# Patient Record
Sex: MALE | Race: WHITE | NOT HISPANIC OR LATINO | Employment: FULL TIME | URBAN - NONMETROPOLITAN AREA
[De-identification: names, ages, dates, MRNs, and addresses within clinical notes are randomized per-mention and may not be internally consistent; named-entity substitution may affect disease eponyms.]

---

## 2018-05-02 ENCOUNTER — LAB (OUTPATIENT)
Dept: LAB | Facility: OTHER | Age: 23
End: 2018-05-02

## 2018-05-02 ENCOUNTER — OFFICE VISIT (OUTPATIENT)
Dept: FAMILY MEDICINE CLINIC | Facility: CLINIC | Age: 23
End: 2018-05-02

## 2018-05-02 VITALS
HEIGHT: 71 IN | SYSTOLIC BLOOD PRESSURE: 114 MMHG | WEIGHT: 147 LBS | TEMPERATURE: 97.2 F | DIASTOLIC BLOOD PRESSURE: 78 MMHG | OXYGEN SATURATION: 97 % | BODY MASS INDEX: 20.58 KG/M2 | HEART RATE: 77 BPM

## 2018-05-02 DIAGNOSIS — Z72.51 HIGH RISK SEXUAL BEHAVIOR: ICD-10-CM

## 2018-05-02 DIAGNOSIS — F17.200 TOBACCO DEPENDENCE SYNDROME: ICD-10-CM

## 2018-05-02 DIAGNOSIS — Z11.3 SCREENING EXAMINATION FOR STD (SEXUALLY TRANSMITTED DISEASE): ICD-10-CM

## 2018-05-02 DIAGNOSIS — F32.1 MODERATE SINGLE CURRENT EPISODE OF MAJOR DEPRESSIVE DISORDER (HCC): ICD-10-CM

## 2018-05-02 DIAGNOSIS — Z72.51 HIGH RISK SEXUAL BEHAVIOR: Primary | ICD-10-CM

## 2018-05-02 DIAGNOSIS — N53.19 DISORDER OF EJACULATION: Primary | ICD-10-CM

## 2018-05-02 DIAGNOSIS — Z76.89 ENCOUNTER TO ESTABLISH CARE WITH NEW DOCTOR: ICD-10-CM

## 2018-05-02 DIAGNOSIS — N53.19 DISORDER OF EJACULATION: ICD-10-CM

## 2018-05-02 LAB
ALBUMIN SERPL-MCNC: 4.5 G/DL (ref 3.2–5.5)
ALBUMIN/GLOB SERPL: 1.6 G/DL (ref 1–3)
ALP SERPL-CCNC: 74 U/L (ref 15–121)
ALT SERPL W P-5'-P-CCNC: 21 U/L (ref 10–60)
ANION GAP SERPL CALCULATED.3IONS-SCNC: 5 MMOL/L (ref 5–15)
AST SERPL-CCNC: 23 U/L (ref 10–60)
BASOPHILS # BLD AUTO: 0.05 10*3/MM3 (ref 0–0.2)
BASOPHILS NFR BLD AUTO: 0.9 % (ref 0–2)
BILIRUB SERPL-MCNC: 0.7 MG/DL (ref 0.2–1)
BILIRUB UR QL STRIP: NEGATIVE
BUN BLD-MCNC: 12 MG/DL (ref 8–25)
BUN/CREAT SERPL: 13.3 (ref 7–25)
CALCIUM SPEC-SCNC: 9.3 MG/DL (ref 8.4–10.8)
CHLORIDE SERPL-SCNC: 106 MMOL/L (ref 100–112)
CLARITY UR: ABNORMAL
CO2 SERPL-SCNC: 27 MMOL/L (ref 20–32)
COLOR UR: YELLOW
CREAT BLD-MCNC: 0.9 MG/DL (ref 0.4–1.3)
DEPRECATED RDW RBC AUTO: 43.9 FL (ref 35.1–43.9)
EOSINOPHIL # BLD AUTO: 0.35 10*3/MM3 (ref 0–0.7)
EOSINOPHIL NFR BLD AUTO: 6 % (ref 0–7)
ERYTHROCYTE [DISTWIDTH] IN BLOOD BY AUTOMATED COUNT: 13.2 % (ref 11.5–14.5)
GFR SERPL CREATININE-BSD FRML MDRD: 107 ML/MIN/1.73 (ref 77–179)
GLOBULIN UR ELPH-MCNC: 2.9 GM/DL (ref 2.5–4.6)
GLUCOSE BLD-MCNC: 111 MG/DL (ref 70–100)
GLUCOSE UR STRIP-MCNC: NEGATIVE MG/DL
HCT VFR BLD AUTO: 51.2 % (ref 39–49)
HGB BLD-MCNC: 16.7 G/DL (ref 13.7–17.3)
HGB UR QL STRIP.AUTO: NEGATIVE
KETONES UR QL STRIP: NEGATIVE
LEUKOCYTE ESTERASE UR QL STRIP.AUTO: NEGATIVE
LYMPHOCYTES # BLD AUTO: 1.32 10*3/MM3 (ref 0.6–4.2)
LYMPHOCYTES NFR BLD AUTO: 22.5 % (ref 10–50)
MCH RBC QN AUTO: 29.8 PG (ref 26.5–34)
MCHC RBC AUTO-ENTMCNC: 32.6 G/DL (ref 31.5–36.3)
MCV RBC AUTO: 91.3 FL (ref 80–98)
MONOCYTES # BLD AUTO: 0.66 10*3/MM3 (ref 0–0.9)
MONOCYTES NFR BLD AUTO: 11.2 % (ref 0–12)
NEUTROPHILS # BLD AUTO: 3.49 10*3/MM3 (ref 2–8.6)
NEUTROPHILS NFR BLD AUTO: 59.4 % (ref 37–80)
NITRITE UR QL STRIP: NEGATIVE
PH UR STRIP.AUTO: 7 [PH] (ref 5.5–8)
PLATELET # BLD AUTO: 212 10*3/MM3 (ref 150–450)
PMV BLD AUTO: 11 FL (ref 8–12)
POTASSIUM BLD-SCNC: 3.8 MMOL/L (ref 3.4–5.4)
PROT SERPL-MCNC: 7.4 G/DL (ref 6.7–8.2)
PROT UR QL STRIP: NEGATIVE
RBC # BLD AUTO: 5.61 10*6/MM3 (ref 4.37–5.74)
SODIUM BLD-SCNC: 138 MMOL/L (ref 134–146)
SP GR UR STRIP: 1.02 (ref 1–1.03)
UROBILINOGEN UR QL STRIP: ABNORMAL
WBC NRBC COR # BLD: 5.87 10*3/MM3 (ref 3.2–9.8)

## 2018-05-02 PROCEDURE — 85025 COMPLETE CBC W/AUTO DIFF WBC: CPT | Performed by: FAMILY MEDICINE

## 2018-05-02 PROCEDURE — 99214 OFFICE O/P EST MOD 30 MIN: CPT | Performed by: FAMILY MEDICINE

## 2018-05-02 PROCEDURE — 36415 COLL VENOUS BLD VENIPUNCTURE: CPT | Performed by: FAMILY MEDICINE

## 2018-05-02 PROCEDURE — 80053 COMPREHEN METABOLIC PANEL: CPT | Performed by: FAMILY MEDICINE

## 2018-05-02 PROCEDURE — 81003 URINALYSIS AUTO W/O SCOPE: CPT | Performed by: FAMILY MEDICINE

## 2018-05-02 PROCEDURE — 87591 N.GONORRHOEAE DNA AMP PROB: CPT | Performed by: FAMILY MEDICINE

## 2018-05-02 PROCEDURE — 87491 CHLMYD TRACH DNA AMP PROBE: CPT | Performed by: FAMILY MEDICINE

## 2018-05-02 PROCEDURE — 87661 TRICHOMONAS VAGINALIS AMPLIF: CPT | Performed by: FAMILY MEDICINE

## 2018-05-02 RX ORDER — BUPROPION HYDROCHLORIDE 150 MG/1
150 TABLET, EXTENDED RELEASE ORAL DAILY
Qty: 30 TABLET | Refills: 1 | OUTPATIENT
Start: 2018-05-02 | End: 2020-09-29
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
## 2018-05-03 LAB
C TRACH RRNA CVX QL NAA+PROBE: NEGATIVE
N GONORRHOEA RRNA SPEC QL NAA+PROBE: NEGATIVE
T VAGINALIS DNA VAG QL PROBE+SIG AMP: NORMAL

## 2018-06-02 ENCOUNTER — HOSPITAL ENCOUNTER (EMERGENCY)
Facility: HOSPITAL | Age: 23
Discharge: HOME | End: 2018-06-03
Attending: EMERGENCY MEDICINE | Admitting: EMERGENCY MEDICINE

## 2018-06-02 DIAGNOSIS — J02.9 PHARYNGITIS, UNSPECIFIED ETIOLOGY: Primary | ICD-10-CM

## 2018-06-02 PROCEDURE — 99283 EMERGENCY DEPT VISIT LOW MDM: CPT

## 2018-06-03 VITALS
BODY MASS INDEX: 21.7 KG/M2 | RESPIRATION RATE: 18 BRPM | WEIGHT: 155 LBS | HEIGHT: 71 IN | HEART RATE: 81 BPM | SYSTOLIC BLOOD PRESSURE: 128 MMHG | DIASTOLIC BLOOD PRESSURE: 74 MMHG | TEMPERATURE: 98.4 F | OXYGEN SATURATION: 98 %

## 2018-06-03 LAB — S PYO AG THROAT QL: NEGATIVE

## 2018-06-03 PROCEDURE — 87880 STREP A ASSAY W/OPTIC: CPT | Performed by: EMERGENCY MEDICINE

## 2018-06-03 PROCEDURE — 63710000001 DEXAMETHASONE PER 0.25 MG: Performed by: EMERGENCY MEDICINE

## 2018-06-03 PROCEDURE — 87081 CULTURE SCREEN ONLY: CPT | Performed by: EMERGENCY MEDICINE

## 2018-06-03 RX ADMIN — DEXAMETHASONE 10 MG: 2 TABLET ORAL at 00:47

## 2018-06-03 NOTE — ED PROVIDER NOTES
Subjective   History of Present Illness  22-year-old male presents to the ED because of a sore throat.  And present since last Wednesday but worse on Thursday.  Painful swallowing.  No cough.  No fevers.  Had to miss work on Friday secondary to his symptoms.  Concerning the possibility of strep throat.  Some mild abdominal pain.  No vomiting.  No headaches.  No other medical problems.  Review of Systems   Constitutional: Negative for chills, fatigue and fever.   HENT: Positive for sore throat and trouble swallowing. Negative for congestion, dental problem, nosebleeds, postnasal drip and sinus pressure.    Respiratory: Negative for cough, chest tightness, shortness of breath, wheezing and stridor.    Cardiovascular: Negative for chest pain and leg swelling.   Gastrointestinal: Negative for abdominal pain, constipation, diarrhea, nausea and vomiting.   Genitourinary: Negative for dysuria, flank pain, frequency, hematuria, testicular pain and urgency.   Musculoskeletal: Negative for arthralgias and myalgias.   Skin: Negative for rash.   Neurological: Negative for syncope, light-headedness and headaches.   Psychiatric/Behavioral: Negative.        Past Medical History:   Diagnosis Date   • Allergic rhinitis    • Eye exam, routine    • URI (upper respiratory infection)        No Known Allergies    No past surgical history on file.    No family history on file.    Social History     Social History   • Marital status: Single     Social History Main Topics   • Smoking status: Current Every Day Smoker   • Drug use: Unknown     Other Topics Concern   • Not on file           Objective   Physical Exam   Constitutional: He is oriented to person, place, and time. He appears well-developed and well-nourished.   HENT:   Head: Normocephalic and atraumatic.   Right Ear: External ear normal.   Left Ear: External ear normal.   Patient with pharyngeal erythema and tonsillar swelling with some exudates.  Uvula is midline.  No signs of  peritonsillar or retropharyngeal abscess.   Eyes: Conjunctivae and EOM are normal. Pupils are equal, round, and reactive to light.   Neck: Normal range of motion. Neck supple.   Cardiovascular: Normal rate, regular rhythm and normal heart sounds.    Pulmonary/Chest: Effort normal and breath sounds normal. No respiratory distress. He has no wheezes.   Abdominal: Soft. He exhibits no distension. There is no tenderness. There is no rebound and no guarding.   Genitourinary: Penis normal.   Musculoskeletal: Normal range of motion.   Lymphadenopathy:     He has no cervical adenopathy.   Neurological: He is alert and oriented to person, place, and time.   Skin: Skin is warm and dry. Capillary refill takes less than 2 seconds.   Psychiatric: He has a normal mood and affect.   Nursing note and vitals reviewed.      Procedures           ED Course      Labs Reviewed   RAPID STREP A SCREEN - Normal   BETA HEMOLYTIC STREP CULTURE, THROAT                 MDM  Number of Diagnoses or Management Options  Pharyngitis, unspecified etiology:   Diagnosis management comments: Rapid strep is negative.  Likely viral pharyngitis.  He is on day 3-4 illness now.  Likely have symptoms for few more days.  Decadron for symptom control.  He may follow up with his primary care doctor.        Final diagnoses:   Pharyngitis, unspecified etiology            Mike Valdez MD  06/03/18 0026

## 2018-06-05 LAB — BACTERIA SPEC AEROBE CULT: NORMAL

## 2019-06-28 ENCOUNTER — OFFICE VISIT (OUTPATIENT)
Dept: FAMILY MEDICINE CLINIC | Facility: CLINIC | Age: 24
End: 2019-06-28

## 2019-06-28 ENCOUNTER — LAB (OUTPATIENT)
Dept: LAB | Facility: OTHER | Age: 24
End: 2019-06-28

## 2019-06-28 VITALS
SYSTOLIC BLOOD PRESSURE: 122 MMHG | DIASTOLIC BLOOD PRESSURE: 78 MMHG | HEIGHT: 71 IN | TEMPERATURE: 98.3 F | BODY MASS INDEX: 20.72 KG/M2 | HEART RATE: 83 BPM | OXYGEN SATURATION: 98 % | WEIGHT: 148 LBS

## 2019-06-28 DIAGNOSIS — N50.819 TESTICULAR PAIN: Primary | ICD-10-CM

## 2019-06-28 DIAGNOSIS — Z72.51 HIGH RISK HETEROSEXUAL BEHAVIOR: ICD-10-CM

## 2019-06-28 DIAGNOSIS — N53.19 DISORDER OF EJACULATION: ICD-10-CM

## 2019-06-28 DIAGNOSIS — N50.819 TESTICULAR PAIN: ICD-10-CM

## 2019-06-28 LAB
BILIRUB UR QL STRIP: NEGATIVE
CLARITY UR: CLEAR
COLOR UR: YELLOW
GLUCOSE UR STRIP-MCNC: NEGATIVE MG/DL
HGB UR QL STRIP.AUTO: NEGATIVE
KETONES UR QL STRIP: NEGATIVE
LEUKOCYTE ESTERASE UR QL STRIP.AUTO: NEGATIVE
NITRITE UR QL STRIP: NEGATIVE
PH UR STRIP.AUTO: 6.5 [PH] (ref 5.5–8)
PROT UR QL STRIP: NEGATIVE
SP GR UR STRIP: 1.02 (ref 1–1.03)
UROBILINOGEN UR QL STRIP: NORMAL

## 2019-06-28 PROCEDURE — 81003 URINALYSIS AUTO W/O SCOPE: CPT | Performed by: FAMILY MEDICINE

## 2019-06-28 PROCEDURE — 87491 CHLMYD TRACH DNA AMP PROBE: CPT | Performed by: FAMILY MEDICINE

## 2019-06-28 PROCEDURE — 87591 N.GONORRHOEAE DNA AMP PROB: CPT | Performed by: FAMILY MEDICINE

## 2019-06-28 PROCEDURE — 87661 TRICHOMONAS VAGINALIS AMPLIF: CPT | Performed by: FAMILY MEDICINE

## 2019-06-28 PROCEDURE — 99213 OFFICE O/P EST LOW 20 MIN: CPT | Performed by: FAMILY MEDICINE

## 2019-06-28 NOTE — PROGRESS NOTES
"Subjective   Chief Complaint   Patient presents with   • Testicle Pain     needs referral      Scooter Watson is a 23 y.o. male.   Testicle Pain (needs referral )    History of Present Illness     Presents today with complaints of testicular pain  He was seen before for this same complaints and was evaluated and referred to a urologist but he missed this appointment and then he lost his insurance.  He is sexually active and admits to sleeping with multiple partners  He admits this has been going on for over a month.  He does admit this first started last year in May.  He admits this has gradually worsened.  The pain in the testicles is described as throbbing.  Pain is 1/10 today.  He denies use of anything over the counter.  Last sexual intercourse was yesterday.    He complains that he has a scant amount of semen with ejaculation but no pain.  He does have dysuria intermittently.    The following portions of the patient's history were reviewed and updated as appropriate: allergies, current medications, past family history, past medical history, past social history, past surgical history and problem list.    Review of Systems   Constitutional: Negative for appetite change, chills, fatigue and fever.   HENT: Negative for congestion, ear pain, rhinorrhea and sore throat.    Eyes: Negative for pain.   Respiratory: Negative for cough and shortness of breath.    Cardiovascular: Negative for chest pain and palpitations.   Gastrointestinal: Negative for abdominal pain, constipation, diarrhea and nausea.   Genitourinary: Positive for dysuria and testicular pain. Negative for discharge, flank pain, frequency, penile pain and scrotal swelling.   Musculoskeletal: Negative for back pain, joint swelling and neck pain.   Skin: Negative for rash.   Neurological: Negative for dizziness and headaches.       Objective   /78   Pulse 83   Temp 98.3 °F (36.8 °C) (Oral)   Ht 180.3 cm (71\")   Wt 67.1 kg (148 lb)   SpO2 " 98%   BMI 20.64 kg/m²   Physical Exam   Constitutional: He is oriented to person, place, and time. He appears well-developed and well-nourished.   HENT:   Head: Normocephalic and atraumatic.   Eyes: Pupils are equal, round, and reactive to light.   Cardiovascular: Normal rate, regular rhythm and normal heart sounds.   Pulmonary/Chest: Effort normal and breath sounds normal. No respiratory distress. He has no wheezes. He has no rales.   Genitourinary: Testes normal. Right testis shows no mass, no swelling and no tenderness. Left testis shows no mass, no swelling and no tenderness.   Neurological: He is alert and oriented to person, place, and time.   Skin: Skin is warm and dry.   Psychiatric: He has a normal mood and affect.   Nursing note and vitals reviewed.      Assessment/Plan   Problems Addressed this Visit        Genitourinary    Disorder of ejaculation      Other Visit Diagnoses     Testicular pain    -  Primary    Relevant Orders    Urinalysis With Culture If Indicated - Urine, Clean Catch    Chlamydia trachomatis, Neisseria gonorrhoeae, Trichomonas vaginalis, PCR - Urine, Urine, Clean Catch    High risk heterosexual behavior            Urinalysis and STI panel ordered  Will call the patient with results and refer to urology if necessary  Recheck as needed

## 2019-06-29 LAB
C TRACH RRNA CVX QL NAA+PROBE: NEGATIVE
N GONORRHOEA RRNA SPEC QL NAA+PROBE: NEGATIVE

## 2019-07-02 DIAGNOSIS — N50.819 TESTICULAR PAIN: Primary | ICD-10-CM

## 2020-02-04 ENCOUNTER — TRANSCRIBE ORDERS (OUTPATIENT)
Dept: LAB | Facility: HOSPITAL | Age: 25
End: 2020-02-04

## 2020-02-04 ENCOUNTER — LAB (OUTPATIENT)
Dept: LAB | Facility: HOSPITAL | Age: 25
End: 2020-02-04

## 2020-02-04 DIAGNOSIS — N46.9 MALE INFERTILITY, UNSPECIFIED: ICD-10-CM

## 2020-02-04 DIAGNOSIS — N46.9 MALE INFERTILITY, UNSPECIFIED: Primary | ICD-10-CM

## 2020-02-04 PROCEDURE — 89320 SEMEN ANAL VOL/COUNT/MOT: CPT

## 2020-02-07 LAB
CHARACTER SMN: ABNORMAL
LIQUEFACTION TIME SMN: >60 MINUTES (ref 0–60)
PH SMN: 8 [PH] (ref 6–8)
SEX ABSTIN DURATION TIME PATIENT: ABNORMAL DAYS
SPECIMEN VOL SMN: 2.5 ML (ref 1.5–5)
SPERM # SMN: 166 MILLIONS/ML (ref 60–150)
SPERM MOTILE NFR SMN: 95 % MOTILE (ref 60–100)
SPERM SMN: 60 % NORMAL (ref 70–100)
VISC SMN: ABNORMAL CP

## 2020-09-29 PROCEDURE — U0002 COVID-19 LAB TEST NON-CDC: HCPCS | Performed by: NURSE PRACTITIONER

## 2021-07-21 ENCOUNTER — HOSPITAL ENCOUNTER (EMERGENCY)
Facility: HOSPITAL | Age: 26
Discharge: HOME | End: 2021-07-21
Attending: EMERGENCY MEDICINE | Admitting: FAMILY MEDICINE

## 2021-07-21 VITALS
TEMPERATURE: 97.4 F | OXYGEN SATURATION: 98 % | RESPIRATION RATE: 14 BRPM | BODY MASS INDEX: 21.47 KG/M2 | WEIGHT: 150 LBS | SYSTOLIC BLOOD PRESSURE: 124 MMHG | HEART RATE: 70 BPM | DIASTOLIC BLOOD PRESSURE: 70 MMHG | HEIGHT: 70 IN

## 2021-07-21 DIAGNOSIS — F19.10 SUBSTANCE ABUSE (HCC): Primary | ICD-10-CM

## 2021-07-21 LAB
ALBUMIN SERPL-MCNC: 4.4 G/DL (ref 3.5–5.2)
ALBUMIN/GLOB SERPL: 2.2 G/DL
ALP SERPL-CCNC: 88 U/L (ref 39–117)
ALT SERPL W P-5'-P-CCNC: 24 U/L (ref 1–41)
AMPHET+METHAMPHET UR QL: NEGATIVE
AMPHETAMINES UR QL: NEGATIVE
ANION GAP SERPL CALCULATED.3IONS-SCNC: 5 MMOL/L (ref 5–15)
APAP SERPL-MCNC: <5 MCG/ML (ref 0–30)
AST SERPL-CCNC: 25 U/L (ref 1–40)
BARBITURATES UR QL SCN: NEGATIVE
BASOPHILS # BLD AUTO: 0.06 10*3/MM3 (ref 0–0.2)
BASOPHILS NFR BLD AUTO: 0.8 % (ref 0–1.5)
BENZODIAZ UR QL SCN: NEGATIVE
BILIRUB SERPL-MCNC: <0.2 MG/DL (ref 0–1.2)
BILIRUB UR QL STRIP: NEGATIVE
BUN SERPL-MCNC: 12 MG/DL (ref 6–20)
BUN/CREAT SERPL: 15.6 (ref 7–25)
BUPRENORPHINE SERPL-MCNC: NEGATIVE NG/ML
CALCIUM SPEC-SCNC: 8.8 MG/DL (ref 8.6–10.5)
CANNABINOIDS SERPL QL: POSITIVE
CHLORIDE SERPL-SCNC: 103 MMOL/L (ref 98–107)
CK SERPL-CCNC: 233 U/L (ref 20–200)
CLARITY UR: CLEAR
CO2 SERPL-SCNC: 28 MMOL/L (ref 22–29)
COCAINE UR QL: NEGATIVE
COLOR UR: YELLOW
CREAT SERPL-MCNC: 0.77 MG/DL (ref 0.76–1.27)
DEPRECATED RDW RBC AUTO: 41.1 FL (ref 37–54)
EOSINOPHIL # BLD AUTO: 0.53 10*3/MM3 (ref 0–0.4)
EOSINOPHIL NFR BLD AUTO: 6.9 % (ref 0.3–6.2)
ERYTHROCYTE [DISTWIDTH] IN BLOOD BY AUTOMATED COUNT: 12.7 % (ref 12.3–15.4)
ETHANOL BLD-MCNC: <10 MG/DL (ref 0–10)
ETHANOL UR QL: <0.01 %
GFR SERPL CREATININE-BSD FRML MDRD: 124 ML/MIN/1.73
GLOBULIN UR ELPH-MCNC: 2 GM/DL
GLUCOSE SERPL-MCNC: 104 MG/DL (ref 65–99)
GLUCOSE UR STRIP-MCNC: NEGATIVE MG/DL
HCT VFR BLD AUTO: 42.3 % (ref 37.5–51)
HGB BLD-MCNC: 13.8 G/DL (ref 13–17.7)
HGB UR QL STRIP.AUTO: NEGATIVE
IMM GRANULOCYTES # BLD AUTO: 0.02 10*3/MM3 (ref 0–0.05)
IMM GRANULOCYTES NFR BLD AUTO: 0.3 % (ref 0–0.5)
KETONES UR QL STRIP: NEGATIVE
LEUKOCYTE ESTERASE UR QL STRIP.AUTO: NEGATIVE
LIPASE SERPL-CCNC: 18 U/L (ref 13–60)
LYMPHOCYTES # BLD AUTO: 2.19 10*3/MM3 (ref 0.7–3.1)
LYMPHOCYTES NFR BLD AUTO: 28.3 % (ref 19.6–45.3)
MAGNESIUM SERPL-MCNC: 2.3 MG/DL (ref 1.6–2.6)
MCH RBC QN AUTO: 28.9 PG (ref 26.6–33)
MCHC RBC AUTO-ENTMCNC: 32.6 G/DL (ref 31.5–35.7)
MCV RBC AUTO: 88.7 FL (ref 79–97)
METHADONE UR QL SCN: NEGATIVE
MONOCYTES # BLD AUTO: 0.9 10*3/MM3 (ref 0.1–0.9)
MONOCYTES NFR BLD AUTO: 11.6 % (ref 5–12)
NEUTROPHILS NFR BLD AUTO: 4.03 10*3/MM3 (ref 1.7–7)
NEUTROPHILS NFR BLD AUTO: 52.1 % (ref 42.7–76)
NITRITE UR QL STRIP: NEGATIVE
NRBC BLD AUTO-RTO: 0 /100 WBC (ref 0–0.2)
OPIATES UR QL: NEGATIVE
OXYCODONE UR QL SCN: NEGATIVE
PCP UR QL SCN: NEGATIVE
PH UR STRIP.AUTO: 6 [PH] (ref 5–9)
PLATELET # BLD AUTO: 250 10*3/MM3 (ref 140–450)
PMV BLD AUTO: 10.8 FL (ref 6–12)
POTASSIUM SERPL-SCNC: 3.9 MMOL/L (ref 3.5–5.2)
PROPOXYPH UR QL: NEGATIVE
PROT SERPL-MCNC: 6.4 G/DL (ref 6–8.5)
PROT UR QL STRIP: ABNORMAL
RBC # BLD AUTO: 4.77 10*6/MM3 (ref 4.14–5.8)
SALICYLATES SERPL-MCNC: <0.3 MG/DL
SODIUM SERPL-SCNC: 136 MMOL/L (ref 136–145)
SP GR UR STRIP: 1.03 (ref 1–1.03)
TRICYCLICS UR QL SCN: NEGATIVE
TROPONIN T SERPL-MCNC: <0.01 NG/ML (ref 0–0.03)
UROBILINOGEN UR QL STRIP: ABNORMAL
WBC # BLD AUTO: 7.73 10*3/MM3 (ref 3.4–10.8)

## 2021-07-21 PROCEDURE — 80179 DRUG ASSAY SALICYLATE: CPT | Performed by: EMERGENCY MEDICINE

## 2021-07-21 PROCEDURE — 81003 URINALYSIS AUTO W/O SCOPE: CPT | Performed by: EMERGENCY MEDICINE

## 2021-07-21 PROCEDURE — 80306 DRUG TEST PRSMV INSTRMNT: CPT | Performed by: EMERGENCY MEDICINE

## 2021-07-21 PROCEDURE — 82550 ASSAY OF CK (CPK): CPT | Performed by: FAMILY MEDICINE

## 2021-07-21 PROCEDURE — 99284 EMERGENCY DEPT VISIT MOD MDM: CPT

## 2021-07-21 PROCEDURE — 83735 ASSAY OF MAGNESIUM: CPT | Performed by: FAMILY MEDICINE

## 2021-07-21 PROCEDURE — 82077 ASSAY SPEC XCP UR&BREATH IA: CPT | Performed by: EMERGENCY MEDICINE

## 2021-07-21 PROCEDURE — 80143 DRUG ASSAY ACETAMINOPHEN: CPT | Performed by: EMERGENCY MEDICINE

## 2021-07-21 PROCEDURE — 83690 ASSAY OF LIPASE: CPT | Performed by: FAMILY MEDICINE

## 2021-07-21 PROCEDURE — 80053 COMPREHEN METABOLIC PANEL: CPT | Performed by: EMERGENCY MEDICINE

## 2021-07-21 PROCEDURE — 85025 COMPLETE CBC W/AUTO DIFF WBC: CPT | Performed by: EMERGENCY MEDICINE

## 2021-07-21 PROCEDURE — 84484 ASSAY OF TROPONIN QUANT: CPT | Performed by: FAMILY MEDICINE

## 2021-07-21 RX ORDER — SODIUM CHLORIDE 0.9 % (FLUSH) 0.9 %
10 SYRINGE (ML) INJECTION AS NEEDED
Status: DISCONTINUED | OUTPATIENT
Start: 2021-07-21 | End: 2021-07-21 | Stop reason: HOSPADM

## 2021-07-21 RX ADMIN — SODIUM CHLORIDE 1000 ML: 900 INJECTION, SOLUTION INTRAVENOUS at 07:07

## 2021-07-21 NOTE — ED NOTES
Patient is at an increased risk for falls. Fall light activated, yellow falls bracelet and yellow non-skid socks placed on patient. Call light within reach and patient instructed to call for assistance. Side rails up x2, bed alarm activated, and gait belt readily accessible.            Katelynn Viera RN  07/21/21 5170

## 2021-07-21 NOTE — ED NOTES
"Pt. Presents to the ED via EMS from home as a possible overdose on percocet laced with fentanyl and alcohol.  Pt. States that he did take percocets tonight but unsure on how many and states he drank a pint of whiskey.  Pt. Alert to verbal stimuli but easily goes back to sleep.  Sister called EMS after finding him with \"shallow respirations, and only responsive to sternal rubs\".  EMS states he has been responsive since they arrived.  Pt. Answering questions appropriately at this time.  VSS.  Denies any SI/HI     Katelynn Viera RN  07/21/21 0535    "

## 2021-07-21 NOTE — ED NOTES
Patient attempted to provide urine specimen at this time. Unable to. Urinal at bedside. Patient to try again.     Roselia Zarate RN  07/21/21 4117

## 2021-07-21 NOTE — ED NOTES
Patient awake and oriented, able to ambulate independently, mother en route to pick patient up, dc'd patient to lobby and notified security.     Roselia Zarate RN  07/21/21 5072

## 2021-07-21 NOTE — ED PROVIDER NOTES
Subjective   25-year-old male who admits to intermittent illicit drug use and alcohol use is brought to the emergency department via EMS from home her sister found him reportedly unresponsive and barely breathing.  Reportedly only responsive to painful stimuli for EMS but maintaining oxygen saturation.  He was not given anything.  He reportedly took unknown amount of Percocet and drink fifth of alcohol.  Patient has no complaints at time of arrival, is very groggy and denies any intent to hurt himself.    Family history, surgical history, social history, current medications and allergies are reviewed with the patient and triage documentation and vitals are reviewed.      History provided by:  Patient and EMS personnel   used: No        Review of Systems   Constitutional: Negative for chills and fever.   HENT: Negative for congestion and sore throat.    Eyes: Negative for photophobia and visual disturbance.   Respiratory: Negative for cough, shortness of breath and wheezing.    Gastrointestinal: Negative for abdominal pain, diarrhea, nausea and vomiting.   Endocrine: Negative for polydipsia, polyphagia and polyuria.   Genitourinary: Negative for dysuria, frequency and urgency.   Musculoskeletal: Negative for arthralgias, back pain, myalgias and neck pain.   Skin: Negative for rash and wound.   Allergic/Immunologic: Negative.    Neurological: Negative for weakness, light-headedness, numbness and headaches.   Hematological: Negative.    Psychiatric/Behavioral: Negative for dysphoric mood, self-injury, sleep disturbance and suicidal ideas. The patient is not nervous/anxious.        Past Medical History:   Diagnosis Date   • Allergic rhinitis    • Depression    • Eye exam, routine    • URI (upper respiratory infection)        No Known Allergies    History reviewed. No pertinent surgical history.    History reviewed. No pertinent family history.    Social History     Socioeconomic History   • Marital  status: Single     Spouse name: Not on file   • Number of children: Not on file   • Years of education: Not on file   • Highest education level: Not on file   Tobacco Use   • Smoking status: Current Every Day Smoker     Packs/day: 0.50     Types: Cigarettes   Substance and Sexual Activity   • Alcohol use: Yes     Alcohol/week: 50.0 standard drinks     Types: 50 Cans of beer per week     Comment: whiskey and beer   • Drug use: No           Objective   Physical Exam  Vitals and nursing note reviewed.   Constitutional:       General: He is sleeping. He is not in acute distress.     Appearance: Normal appearance. He is normal weight. He is not ill-appearing, toxic-appearing or diaphoretic.   HENT:      Head: Normocephalic and atraumatic.      Mouth/Throat:      Mouth: Mucous membranes are moist.      Pharynx: Oropharynx is clear.   Eyes:      Conjunctiva/sclera: Conjunctivae normal.      Pupils: Pupils are equal, round, and reactive to light.   Cardiovascular:      Rate and Rhythm: Normal rate and regular rhythm.      Pulses: Normal pulses.      Heart sounds: No murmur heard.     Pulmonary:      Effort: Pulmonary effort is normal.      Breath sounds: Normal breath sounds.   Abdominal:      General: Bowel sounds are normal.      Palpations: Abdomen is soft.      Tenderness: There is no abdominal tenderness. There is no guarding or rebound.   Musculoskeletal:         General: No tenderness or deformity. Normal range of motion.      Cervical back: Normal range of motion and neck supple.      Right lower leg: No edema.      Left lower leg: No edema.   Skin:     General: Skin is warm and dry.      Capillary Refill: Capillary refill takes less than 2 seconds.      Coloration: Skin is pale.   Neurological:      General: No focal deficit present.      GCS: GCS eye subscore is 3. GCS verbal subscore is 4. GCS motor subscore is 6.      Sensory: Sensation is intact.      Motor: Motor function is intact.      Deep Tendon Reflexes:       Reflex Scores:       Bicep reflexes are 2+ on the right side and 2+ on the left side.       Patellar reflexes are 2+ on the right side and 2+ on the left side.  Psychiatric:         Mood and Affect: Mood normal.         Behavior: Behavior normal. Behavior is cooperative.         Procedures  none         ED Course  ED Course as of Jul 21 1025 Wed Jul 21, 2021   1024 Patient is awake alert and oriented in no acute distress.  He currently has no complaints.  He was advised to follow with primary care in 2 days and return the emergency department should he have any other questions, concerns, or worsening of his symptoms.    [CB]      ED Course User Index  [CB] Kurt Tovar MD      Labs Reviewed   COMPREHENSIVE METABOLIC PANEL - Abnormal; Notable for the following components:       Result Value    Glucose 104 (*)     All other components within normal limits    Narrative:     GFR Normal >60  Chronic Kidney Disease <60  Kidney Failure <15     URINALYSIS W/ MICROSCOPIC IF INDICATED (NO CULTURE) - Abnormal; Notable for the following components:    Protein, UA Trace (*)     All other components within normal limits    Narrative:     Urine microscopic not indicated.   URINE DRUG SCREEN - Abnormal; Notable for the following components:    THC, Screen, Urine Positive (*)     All other components within normal limits    Narrative:     Cutoff For Drugs Screened:    Amphetamines               500 ng/ml  Barbiturates               200 ng/ml  Benzodiazepines            150 ng/ml  Cocaine                    150 ng/ml  Methadone                  200 ng/ml  Opiates                    100 ng/ml  Phencyclidine               25 ng/ml  THC                            50 ng/ml  Methamphetamine            500 ng/ml  Tricyclic Antidepressants  300 ng/ml  Oxycodone                  100 ng/ml  Propoxyphene               300 ng/ml  Buprenorphine               10 ng/ml    The normal value for all drugs tested is negative. This  report includes unconfirmed screening results, with the cutoff values listed, to be used for medical treatment purposes only.  Unconfirmed results must not be used for non-medical purposes such as employment or legal testing.  Clinical consideration should be applied to any drug of abuse test, particularly when unconfirmed results are used.     CBC WITH AUTO DIFFERENTIAL - Abnormal; Notable for the following components:    Eosinophil % 6.9 (*)     Eosinophils, Absolute 0.53 (*)     All other components within normal limits   CK - Abnormal; Notable for the following components:    Creatine Kinase 233 (*)     All other components within normal limits   ACETAMINOPHEN LEVEL - Normal   SALICYLATE LEVEL - Normal   LIPASE - Normal   TROPONIN (IN-HOUSE) - Normal    Narrative:     Troponin T Reference Range:  <= 0.03 ng/mL-   Negative for AMI  >0.03 ng/mL-     Abnormal for myocardial necrosis.  Clinicians would have to utilize clinical acumen, EKG, Troponin and serial changes to determine if it is an Acute Myocardial Infarction or myocardial injury due to an underlying chronic condition.       Results may be falsely decreased if patient taking Biotin.     MAGNESIUM - Normal   ETHANOL   CBC AND DIFFERENTIAL    Narrative:     The following orders were created for panel order CBC & Differential.  Procedure                               Abnormality         Status                     ---------                               -----------         ------                     CBC Auto Differential[[id number]]        Abnormal            Final result                 Please view results for these tests on the individual orders.     No results found.          MDM  Number of Diagnoses or Management Options     Amount and/or Complexity of Data Reviewed  Clinical lab tests: reviewed    Patient Progress  Patient progress: stable    Patient with alcohol level of 0.  Urine still pending at time of the end of my shift patient will be signed out to  Dr. Tovar.  Please see his documentation for final disposition.    Final diagnoses:   Substance abuse (CMS/HCC)       ED Disposition  ED Disposition     ED Disposition Condition Comment    Discharge Stable           [facility]  [address] [place] [id number] [phone]  In 2 days  Even if well         Medication List      No changes were made to your prescriptions during this visit.          Kurt Tovar MD  07/21/21 7512

## 2021-09-28 PROCEDURE — U0004 COV-19 TEST NON-CDC HGH THRU: HCPCS | Performed by: NURSE PRACTITIONER

## 2022-08-06 ENCOUNTER — HOSPITAL ENCOUNTER (EMERGENCY)
Facility: HOSPITAL | Age: 27
Discharge: NURSING, REHAB OR LONG-TERM CARE | End: 2022-08-07
Attending: STUDENT IN AN ORGANIZED HEALTH CARE EDUCATION/TRAINING PROGRAM

## 2022-08-06 DIAGNOSIS — R45.851 SUICIDAL THOUGHTS: Primary | ICD-10-CM

## 2022-08-06 LAB
ALBUMIN SERPL-MCNC: 4.8 G/DL (ref 3.5–5.2)
ALBUMIN/GLOB SERPL: 1.8 G/DL
ALP SERPL-CCNC: 80 U/L (ref 39–117)
ALT SERPL W P-5'-P-CCNC: 56 U/L (ref 1–41)
ANION GAP SERPL CALCULATED.3IONS-SCNC: 11 MMOL/L (ref 5–15)
AST SERPL-CCNC: 28 U/L (ref 1–40)
BACTERIA UR QL AUTO: NORMAL /HPF
BASOPHILS # BLD AUTO: 0.06 10*3/MM3 (ref 0–0.2)
BASOPHILS NFR BLD AUTO: 0.5 % (ref 0–1.5)
BILIRUB SERPL-MCNC: 0.6 MG/DL (ref 0–1.2)
BILIRUB UR QL STRIP: NEGATIVE
BUN SERPL-MCNC: 11 MG/DL (ref 6–20)
BUN/CREAT SERPL: 14.1 (ref 7–25)
CALCIUM SPEC-SCNC: 9.9 MG/DL (ref 8.6–10.5)
CHLORIDE SERPL-SCNC: 108 MMOL/L (ref 98–107)
CLARITY UR: CLEAR
CO2 SERPL-SCNC: 24 MMOL/L (ref 22–29)
COLOR UR: YELLOW
CREAT SERPL-MCNC: 0.78 MG/DL (ref 0.76–1.27)
DEPRECATED RDW RBC AUTO: 42.2 FL (ref 37–54)
EGFRCR SERPLBLD CKD-EPI 2021: 126.9 ML/MIN/1.73
EOSINOPHIL # BLD AUTO: 0.06 10*3/MM3 (ref 0–0.4)
EOSINOPHIL NFR BLD AUTO: 0.5 % (ref 0.3–6.2)
ERYTHROCYTE [DISTWIDTH] IN BLOOD BY AUTOMATED COUNT: 13.4 % (ref 12.3–15.4)
GLOBULIN UR ELPH-MCNC: 2.6 GM/DL
GLUCOSE SERPL-MCNC: 110 MG/DL (ref 65–99)
GLUCOSE UR STRIP-MCNC: NEGATIVE MG/DL
HCT VFR BLD AUTO: 46.9 % (ref 37.5–51)
HGB BLD-MCNC: 16.3 G/DL (ref 13–17.7)
HGB UR QL STRIP.AUTO: NEGATIVE
HOLD SPECIMEN: NORMAL
HYALINE CASTS UR QL AUTO: NORMAL /LPF
IMM GRANULOCYTES # BLD AUTO: 0.04 10*3/MM3 (ref 0–0.05)
IMM GRANULOCYTES NFR BLD AUTO: 0.4 % (ref 0–0.5)
KETONES UR QL STRIP: NEGATIVE
LEUKOCYTE ESTERASE UR QL STRIP.AUTO: ABNORMAL
LYMPHOCYTES # BLD AUTO: 1.81 10*3/MM3 (ref 0.7–3.1)
LYMPHOCYTES NFR BLD AUTO: 16.5 % (ref 19.6–45.3)
MCH RBC QN AUTO: 30 PG (ref 26.6–33)
MCHC RBC AUTO-ENTMCNC: 34.8 G/DL (ref 31.5–35.7)
MCV RBC AUTO: 86.4 FL (ref 79–97)
MONOCYTES # BLD AUTO: 0.97 10*3/MM3 (ref 0.1–0.9)
MONOCYTES NFR BLD AUTO: 8.8 % (ref 5–12)
NEUTROPHILS NFR BLD AUTO: 73.3 % (ref 42.7–76)
NEUTROPHILS NFR BLD AUTO: 8.03 10*3/MM3 (ref 1.7–7)
NITRITE UR QL STRIP: NEGATIVE
NRBC BLD AUTO-RTO: 0 /100 WBC (ref 0–0.2)
PH UR STRIP.AUTO: 6.5 [PH] (ref 5–9)
PLATELET # BLD AUTO: 311 10*3/MM3 (ref 140–450)
PMV BLD AUTO: 10 FL (ref 6–12)
POTASSIUM SERPL-SCNC: 3.9 MMOL/L (ref 3.5–5.2)
PROT SERPL-MCNC: 7.4 G/DL (ref 6–8.5)
PROT UR QL STRIP: NEGATIVE
RBC # BLD AUTO: 5.43 10*6/MM3 (ref 4.14–5.8)
RBC # UR STRIP: NORMAL /HPF
REF LAB TEST METHOD: NORMAL
SODIUM SERPL-SCNC: 143 MMOL/L (ref 136–145)
SP GR UR STRIP: 1.02 (ref 1–1.03)
SQUAMOUS #/AREA URNS HPF: NORMAL /HPF
UROBILINOGEN UR QL STRIP: ABNORMAL
WBC # UR STRIP: NORMAL /HPF
WBC NRBC COR # BLD: 10.97 10*3/MM3 (ref 3.4–10.8)
WHOLE BLOOD HOLD COAG: NORMAL

## 2022-08-06 PROCEDURE — 82306 VITAMIN D 25 HYDROXY: CPT | Performed by: PSYCHIATRY & NEUROLOGY

## 2022-08-06 PROCEDURE — 82607 VITAMIN B-12: CPT | Performed by: PSYCHIATRY & NEUROLOGY

## 2022-08-06 PROCEDURE — 99285 EMERGENCY DEPT VISIT HI MDM: CPT

## 2022-08-06 PROCEDURE — 80053 COMPREHEN METABOLIC PANEL: CPT | Performed by: STUDENT IN AN ORGANIZED HEALTH CARE EDUCATION/TRAINING PROGRAM

## 2022-08-06 PROCEDURE — 82746 ASSAY OF FOLIC ACID SERUM: CPT | Performed by: PSYCHIATRY & NEUROLOGY

## 2022-08-06 PROCEDURE — 85025 COMPLETE CBC W/AUTO DIFF WBC: CPT | Performed by: STUDENT IN AN ORGANIZED HEALTH CARE EDUCATION/TRAINING PROGRAM

## 2022-08-06 PROCEDURE — 25010000002 ONDANSETRON PER 1 MG: Performed by: STUDENT IN AN ORGANIZED HEALTH CARE EDUCATION/TRAINING PROGRAM

## 2022-08-06 PROCEDURE — 81001 URINALYSIS AUTO W/SCOPE: CPT | Performed by: STUDENT IN AN ORGANIZED HEALTH CARE EDUCATION/TRAINING PROGRAM

## 2022-08-06 RX ORDER — ROPINIROLE 0.25 MG/1
0.25 TABLET, FILM COATED ORAL ONCE
Status: COMPLETED | OUTPATIENT
Start: 2022-08-06 | End: 2022-08-07

## 2022-08-06 RX ORDER — ONDANSETRON 2 MG/ML
4 INJECTION INTRAMUSCULAR; INTRAVENOUS ONCE
Status: COMPLETED | OUTPATIENT
Start: 2022-08-06 | End: 2022-08-06

## 2022-08-06 RX ORDER — SODIUM CHLORIDE 0.9 % (FLUSH) 0.9 %
10 SYRINGE (ML) INJECTION AS NEEDED
Status: DISCONTINUED | OUTPATIENT
Start: 2022-08-06 | End: 2022-08-07 | Stop reason: HOSPADM

## 2022-08-06 RX ADMIN — SODIUM CHLORIDE, POTASSIUM CHLORIDE, SODIUM LACTATE AND CALCIUM CHLORIDE 1000 ML: 600; 310; 30; 20 INJECTION, SOLUTION INTRAVENOUS at 20:44

## 2022-08-06 RX ADMIN — ONDANSETRON 4 MG: 2 INJECTION INTRAMUSCULAR; INTRAVENOUS at 20:43

## 2022-08-07 ENCOUNTER — HOSPITAL ENCOUNTER (INPATIENT)
Facility: HOSPITAL | Age: 27
LOS: 1 days | Discharge: HOME | End: 2022-08-08
Attending: PSYCHIATRY & NEUROLOGY | Admitting: PSYCHIATRY & NEUROLOGY

## 2022-08-07 VITALS
HEIGHT: 71 IN | DIASTOLIC BLOOD PRESSURE: 80 MMHG | HEART RATE: 75 BPM | TEMPERATURE: 98.7 F | SYSTOLIC BLOOD PRESSURE: 125 MMHG | WEIGHT: 159 LBS | RESPIRATION RATE: 18 BRPM | OXYGEN SATURATION: 99 % | BODY MASS INDEX: 22.26 KG/M2

## 2022-08-07 DIAGNOSIS — F11.93 OPIOID WITHDRAWAL: Primary | ICD-10-CM

## 2022-08-07 PROBLEM — F32.A DEPRESSION: Status: ACTIVE | Noted: 2022-08-07

## 2022-08-07 PROBLEM — F11.20 OPIOID USE DISORDER, SEVERE, DEPENDENCE: Status: ACTIVE | Noted: 2022-08-07

## 2022-08-07 PROBLEM — R45.851 SUICIDAL IDEATION: Status: ACTIVE | Noted: 2022-08-07

## 2022-08-07 LAB
25(OH)D3 SERPL-MCNC: 34.8 NG/ML (ref 30–100)
AMPHET+METHAMPHET UR QL: NEGATIVE
AMPHETAMINES UR QL: NEGATIVE
APAP SERPL-MCNC: <5 MCG/ML (ref 0–30)
BARBITURATES UR QL SCN: NEGATIVE
BENZODIAZ UR QL SCN: NEGATIVE
BUPRENORPHINE SERPL-MCNC: NEGATIVE NG/ML
CANNABINOIDS SERPL QL: NEGATIVE
COCAINE UR QL: NEGATIVE
ETHANOL BLD-MCNC: <10 MG/DL (ref 0–10)
ETHANOL UR QL: <0.01 %
FLUAV SUBTYP SPEC NAA+PROBE: NOT DETECTED
FLUBV RNA ISLT QL NAA+PROBE: NOT DETECTED
FOLATE SERPL-MCNC: 10.6 NG/ML (ref 4.78–24.2)
METHADONE UR QL SCN: NEGATIVE
OPIATES UR QL: NEGATIVE
OXYCODONE UR QL SCN: NEGATIVE
PCP UR QL SCN: NEGATIVE
PROPOXYPH UR QL: NEGATIVE
SALICYLATES SERPL-MCNC: <0.3 MG/DL
SARS-COV-2 RNA PNL SPEC NAA+PROBE: NOT DETECTED
T4 FREE SERPL-MCNC: 1.04 NG/DL (ref 0.93–1.7)
TRICYCLICS UR QL SCN: NEGATIVE
TSH SERPL DL<=0.05 MIU/L-ACNC: 0.17 UIU/ML (ref 0.27–4.2)
VIT B12 BLD-MCNC: 483 PG/ML (ref 211–946)

## 2022-08-07 PROCEDURE — 63710000001 ONDANSETRON ODT 4 MG TABLET DISPERSIBLE: Performed by: PSYCHIATRY & NEUROLOGY

## 2022-08-07 PROCEDURE — 90833 PSYTX W PT W E/M 30 MIN: CPT | Performed by: PSYCHIATRY & NEUROLOGY

## 2022-08-07 PROCEDURE — 82077 ASSAY SPEC XCP UR&BREATH IA: CPT | Performed by: STUDENT IN AN ORGANIZED HEALTH CARE EDUCATION/TRAINING PROGRAM

## 2022-08-07 PROCEDURE — 99223 1ST HOSP IP/OBS HIGH 75: CPT | Performed by: PSYCHIATRY & NEUROLOGY

## 2022-08-07 PROCEDURE — 87636 SARSCOV2 & INF A&B AMP PRB: CPT | Performed by: STUDENT IN AN ORGANIZED HEALTH CARE EDUCATION/TRAINING PROGRAM

## 2022-08-07 PROCEDURE — 80179 DRUG ASSAY SALICYLATE: CPT | Performed by: STUDENT IN AN ORGANIZED HEALTH CARE EDUCATION/TRAINING PROGRAM

## 2022-08-07 PROCEDURE — 93010 ELECTROCARDIOGRAM REPORT: CPT | Performed by: INTERNAL MEDICINE

## 2022-08-07 PROCEDURE — 80143 DRUG ASSAY ACETAMINOPHEN: CPT | Performed by: STUDENT IN AN ORGANIZED HEALTH CARE EDUCATION/TRAINING PROGRAM

## 2022-08-07 PROCEDURE — 93005 ELECTROCARDIOGRAM TRACING: CPT | Performed by: STUDENT IN AN ORGANIZED HEALTH CARE EDUCATION/TRAINING PROGRAM

## 2022-08-07 PROCEDURE — 84443 ASSAY THYROID STIM HORMONE: CPT | Performed by: PSYCHIATRY & NEUROLOGY

## 2022-08-07 PROCEDURE — 84439 ASSAY OF FREE THYROXINE: CPT | Performed by: PSYCHIATRY & NEUROLOGY

## 2022-08-07 PROCEDURE — 80306 DRUG TEST PRSMV INSTRMNT: CPT | Performed by: STUDENT IN AN ORGANIZED HEALTH CARE EDUCATION/TRAINING PROGRAM

## 2022-08-07 PROCEDURE — HZ2ZZZZ DETOXIFICATION SERVICES FOR SUBSTANCE ABUSE TREATMENT: ICD-10-PCS | Performed by: PSYCHIATRY & NEUROLOGY

## 2022-08-07 RX ORDER — CLONIDINE HYDROCHLORIDE 0.1 MG/1
0.1 TABLET ORAL 4 TIMES DAILY PRN
Status: ACTIVE | OUTPATIENT
Start: 2022-08-07 | End: 2022-08-08

## 2022-08-07 RX ORDER — BUPROPION HYDROCHLORIDE 75 MG/1
75 TABLET ORAL ONCE
Status: COMPLETED | OUTPATIENT
Start: 2022-08-07 | End: 2022-08-07

## 2022-08-07 RX ORDER — HYDROXYZINE PAMOATE 50 MG/1
50 CAPSULE ORAL EVERY 6 HOURS PRN
Status: DISCONTINUED | OUTPATIENT
Start: 2022-08-07 | End: 2022-08-08 | Stop reason: HOSPADM

## 2022-08-07 RX ORDER — CLONIDINE HYDROCHLORIDE 0.1 MG/1
0.1 TABLET ORAL EVERY 4 HOURS PRN
Status: DISCONTINUED | OUTPATIENT
Start: 2022-08-07 | End: 2022-08-07

## 2022-08-07 RX ORDER — CLONIDINE HYDROCHLORIDE 0.1 MG/1
0.1 TABLET ORAL 3 TIMES DAILY PRN
Status: DISCONTINUED | OUTPATIENT
Start: 2022-08-09 | End: 2022-08-08 | Stop reason: HOSPADM

## 2022-08-07 RX ORDER — CLONIDINE HYDROCHLORIDE 0.1 MG/1
0.1 TABLET ORAL ONCE AS NEEDED
Status: DISCONTINUED | OUTPATIENT
Start: 2022-08-11 | End: 2022-08-08 | Stop reason: HOSPADM

## 2022-08-07 RX ORDER — NICOTINE 21 MG/24HR
1 PATCH, TRANSDERMAL 24 HOURS TRANSDERMAL EVERY 24 HOURS
Status: DISCONTINUED | OUTPATIENT
Start: 2022-08-07 | End: 2022-08-08 | Stop reason: HOSPADM

## 2022-08-07 RX ORDER — BUPROPION HYDROCHLORIDE 150 MG/1
150 TABLET ORAL DAILY
Status: DISCONTINUED | OUTPATIENT
Start: 2022-08-08 | End: 2022-08-08 | Stop reason: HOSPADM

## 2022-08-07 RX ORDER — ROPINIROLE 0.5 MG/1
0.5 TABLET, FILM COATED ORAL NIGHTLY
Status: DISCONTINUED | OUTPATIENT
Start: 2022-08-07 | End: 2022-08-08 | Stop reason: HOSPADM

## 2022-08-07 RX ORDER — LOPERAMIDE HYDROCHLORIDE 2 MG/1
2 CAPSULE ORAL
Status: DISCONTINUED | OUTPATIENT
Start: 2022-08-07 | End: 2022-08-08 | Stop reason: HOSPADM

## 2022-08-07 RX ORDER — ZOLPIDEM TARTRATE 5 MG/1
5 TABLET ORAL NIGHTLY
Status: DISCONTINUED | OUTPATIENT
Start: 2022-08-07 | End: 2022-08-08 | Stop reason: HOSPADM

## 2022-08-07 RX ORDER — ACETAMINOPHEN 325 MG/1
650 TABLET ORAL EVERY 4 HOURS PRN
Status: DISCONTINUED | OUTPATIENT
Start: 2022-08-07 | End: 2022-08-08 | Stop reason: HOSPADM

## 2022-08-07 RX ORDER — TRAZODONE HYDROCHLORIDE 50 MG/1
50 TABLET ORAL NIGHTLY PRN
Status: DISCONTINUED | OUTPATIENT
Start: 2022-08-07 | End: 2022-08-07

## 2022-08-07 RX ORDER — ALUMINA, MAGNESIA, AND SIMETHICONE 2400; 2400; 240 MG/30ML; MG/30ML; MG/30ML
15 SUSPENSION ORAL EVERY 6 HOURS PRN
Status: DISCONTINUED | OUTPATIENT
Start: 2022-08-07 | End: 2022-08-08 | Stop reason: HOSPADM

## 2022-08-07 RX ORDER — ONDANSETRON 4 MG/1
4 TABLET, ORALLY DISINTEGRATING ORAL EVERY 6 HOURS PRN
Status: DISCONTINUED | OUTPATIENT
Start: 2022-08-07 | End: 2022-08-08 | Stop reason: HOSPADM

## 2022-08-07 RX ORDER — CLONIDINE HYDROCHLORIDE 0.1 MG/1
0.1 TABLET ORAL 2 TIMES DAILY PRN
Status: DISCONTINUED | OUTPATIENT
Start: 2022-08-10 | End: 2022-08-08 | Stop reason: HOSPADM

## 2022-08-07 RX ORDER — ZOLPIDEM TARTRATE 5 MG/1
5 TABLET ORAL NIGHTLY PRN
Status: DISCONTINUED | OUTPATIENT
Start: 2022-08-07 | End: 2022-08-08 | Stop reason: HOSPADM

## 2022-08-07 RX ORDER — CLONIDINE HYDROCHLORIDE 0.1 MG/1
0.1 TABLET ORAL 4 TIMES DAILY PRN
Status: DISCONTINUED | OUTPATIENT
Start: 2022-08-08 | End: 2022-08-08 | Stop reason: HOSPADM

## 2022-08-07 RX ORDER — NALTREXONE HYDROCHLORIDE 50 MG/1
25 TABLET, FILM COATED ORAL DAILY
Status: COMPLETED | OUTPATIENT
Start: 2022-08-07 | End: 2022-08-07

## 2022-08-07 RX ADMIN — ONDANSETRON 4 MG: 4 TABLET, ORALLY DISINTEGRATING ORAL at 03:58

## 2022-08-07 RX ADMIN — NICOTINE 1 PATCH: 21 PATCH, EXTENDED RELEASE TRANSDERMAL at 08:37

## 2022-08-07 RX ADMIN — ROPINIROLE HYDROCHLORIDE 0.5 MG: 0.5 TABLET, FILM COATED ORAL at 20:11

## 2022-08-07 RX ADMIN — HYDROXYZINE PAMOATE 50 MG: 50 CAPSULE ORAL at 21:16

## 2022-08-07 RX ADMIN — HYDROXYZINE PAMOATE 50 MG: 50 CAPSULE ORAL at 03:58

## 2022-08-07 RX ADMIN — NALTREXONE HYDROCHLORIDE 25 MG: 50 TABLET, FILM COATED ORAL at 17:52

## 2022-08-07 RX ADMIN — ACETAMINOPHEN 650 MG: 325 TABLET, FILM COATED ORAL at 03:58

## 2022-08-07 RX ADMIN — ZOLPIDEM TARTRATE 5 MG: 5 TABLET ORAL at 20:10

## 2022-08-07 RX ADMIN — ZOLPIDEM TARTRATE 5 MG: 5 TABLET ORAL at 21:16

## 2022-08-07 RX ADMIN — ROPINIROLE HYDROCHLORIDE 0.25 MG: 0.25 TABLET, FILM COATED ORAL at 00:10

## 2022-08-07 RX ADMIN — ONDANSETRON 4 MG: 4 TABLET, ORALLY DISINTEGRATING ORAL at 20:15

## 2022-08-07 RX ADMIN — BUPROPION HYDROCHLORIDE 75 MG: 75 TABLET ORAL at 17:53

## 2022-08-07 NOTE — ED PROVIDER NOTES
Subjective   Patient presents with feeling of muscle pulling, fatigue, and feeling like his back is broken after stopping car-fentanyl 2-1/2 days ago.  Patient is wanting to detox so he can move out of the area with his family and seek employment.  The last couple days, he has been laying in the bathtub and has not been able to tolerate very much food or fluid.          Review of Systems   Constitutional: Positive for activity change, appetite change and fatigue.   HENT: Negative for congestion and ear pain.    Eyes: Negative for pain and discharge.   Respiratory: Negative for chest tightness and shortness of breath.    Cardiovascular: Negative for chest pain and palpitations.   Gastrointestinal: Positive for abdominal pain. Negative for abdominal distention.   Endocrine: Negative for cold intolerance and heat intolerance.   Genitourinary: Negative for difficulty urinating and dysuria.   Musculoskeletal: Positive for arthralgias and myalgias. Negative for back pain.   Skin: Negative for color change and rash.   Allergic/Immunologic: Negative for environmental allergies and food allergies.   Neurological: Positive for weakness. Negative for dizziness and headaches.   Hematological: Negative for adenopathy. Does not bruise/bleed easily.   Psychiatric/Behavioral: Negative for agitation and confusion.       Past Medical History:   Diagnosis Date   • Allergic rhinitis    • Depression    • Eye exam, routine    • URI (upper respiratory infection)        No Known Allergies    History reviewed. No pertinent surgical history.    History reviewed. No pertinent family history.    Social History     Socioeconomic History   • Marital status: Single   Tobacco Use   • Smoking status: Current Every Day Smoker     Packs/day: 0.50     Types: Cigarettes   Substance and Sexual Activity   • Alcohol use: Yes     Alcohol/week: 50.0 standard drinks     Types: 50 Cans of beer per week     Comment: whiskey and beer   • Drug use: Not Currently      Comment: fentanyl use since 2016; stopped using two days ago           Objective   Physical Exam  Vitals and nursing note reviewed.   Constitutional:       Appearance: He is well-developed.   HENT:      Head: Normocephalic and atraumatic.   Eyes:      Pupils: Pupils are equal, round, and reactive to light.   Neck:      Thyroid: No thyromegaly.      Trachea: No tracheal deviation.   Cardiovascular:      Rate and Rhythm: Normal rate.      Pulses:           Radial pulses are 2+ on the left side.        Dorsalis pedis pulses are 2+ on the right side and 2+ on the left side.      Heart sounds: Normal heart sounds, S1 normal and S2 normal.   Pulmonary:      Effort: Pulmonary effort is normal.      Breath sounds: Normal breath sounds.   Abdominal:      General: Distension: suprapubic.      Palpations: Abdomen is soft.      Tenderness: There is abdominal tenderness.   Musculoskeletal:         General: Normal range of motion.      Cervical back: Neck supple.   Skin:     General: Skin is warm and dry.      Capillary Refill: Capillary refill takes 2 to 3 seconds.   Neurological:      Mental Status: He is alert and oriented to person, place, and time.      GCS: GCS eye subscore is 4. GCS verbal subscore is 5. GCS motor subscore is 6.   Psychiatric:         Speech: Speech normal.         Behavior: Behavior normal.         Thought Content: Thought content normal.         Procedures           ED Course  ED Course as of 08/07/22 0257   Sat Aug 06, 2022   2314 Family at bedside stating that she feels the patient is suicidal.  She states he has been writing notes to his wife on his phone saying how he is going to kill himself. [AYAAN]   2327 Patient denies wanting to hurt himself right now.  He had cut himself a couple of days ago but at this point.  He just wants to sleep.  He is having restless legs and would like something for that. [AYAAN]   Jessica Aug 07, 2022   0256 Patient checked out to me by Dr. Ortiz.  Patient evaluated by  psychiatry recommend admission to the behavioral health unit for further evaluation and treatment. []      ED Course User Index  [] Anurag Camacho MD  [AYAAN] Monae Ortiz MD                                           [facility]    Final diagnoses:   Suicidal thoughts       ED Disposition  ED Disposition     ED Disposition   DC/Transfer to Behavioral Health    Condition   Stable    Comment   --             No follow-up provider specified.       Medication List      No changes were made to your prescriptions during this visit.          Anurag Camacho MD  08/07/22 0257

## 2022-08-07 NOTE — NURSING NOTE
"Pt admitted from ED to room 658 via wheel chair with security and ED tech transporting.  Contraband check performed by security at unit entry door and in personal room.  Pt is alert and oriented x4, calm, cooperative at this time though tired and wants to sleep, signed in as voluntary admission.  Pt denies desire to harm self, states,\"i just have to get myself clean.  Its no life like this.\"  Pt requested supplies for shower and is currently showering.  "

## 2022-08-07 NOTE — PLAN OF CARE
"Goal Outcome Evaluation:  Plan of Care Reviewed With: patient  Patient Agreement with Plan of Care: agrees     Progress: no change  Outcome Evaluation: Pt says that he is hopeful that he is going home today et has been at desk multiple times stating that he was unaware that he had to stay on BHU for more than a day. Pt said that he was mislead into believing that he was going to get sleep here overnight et leave this am. He says that he has spoken to his father et says that \"he tricked me\". Pt says that his father told him to say that he was having SI, et he was not. MD aware of pt wanting to speak to him.  "

## 2022-08-07 NOTE — H&P
"8/7/2022    Source of History:  chart review and the patient    Chief Complaint: suicidal ideation and opiate withdrawal    History of Present Illness:    Patient is a 26 y.o. male who presents with suicidal ideation. Onset of symptoms was abrupt starting a few days ago.  Symptoms have been present on an intermittent basis. Symptoms are associated with anxiety, insomnia, depressed mood and opiate withdrawal.  Symptoms are aggravated by problems with substance abuse.   Patient's symptoms occur in the context of no prior psych admission or suicide attempts.    Patient presented to the ED late last night due to having withdrawal issues from carfentanil.  He had stopped using 2-3 days prior and was having s/s of opiate withdrawal.  His family reported that he had expressed SI and texted wife that he was going to kill himself.  He admitted to cutting his right leg out of anger on 8/4/22.    From Dr. Camacho's ED note:  Family at bedside stating that she feels the patient is suicidal.  She states he has been writing notes to his wife on his phone saying how he is going to kill himself.  Patient denies wanting to hurt himself right now.  He had cut himself a couple of days ago...    From Nurse Barry's intake assessment:  When asked about SI, patient states \"Maybe if I don't get any sleep, I'm going on 3-4 days of no sleep\". Patient states that he got mad a few days ago (8/4) because he told himself he was not going to use and then gave in, and ended up cutting his right leg approx 20 times out of anger. He states this is when he made a comment to his family that he had thoughts of wanting to harm himself. Patient states \"I would never do it, I have 3 boys at home\".     Patient today notes that his withdrawal is better and he is more hopeful and denies suicidal thoughts.  He minimizes concerns from the ED.  He notes interest in sobriety and notes prior trial of suboxone and he would like to not use an opiate.  He is " amenable to trying naltrexone.  He notes continues restless leg and insomnia issues from the opiate withdrawal.  He asks to go ahead and trial naltrexone tonight.    Psychiatric Review Of Systems:  depression, sleep disturbance, suicidal ideations and Pertinent items are noted in HPI.    Past Psychiatric History:    Psychiatric Hospitalizations: Patient has had no prior hospitalizations.    Suicide Attempts: Patient has had no prior suicide attempts.    Prior Treatment and Medications Tried: Treated for ADHD, anxiety and depression.  Given zoloft and risperdal by Dr. Murry in April and then paxil in June.    History of violence or legal issues: The patient has current pending legal charges for receiving stolen property. The patient has a history of violence.  Patient states he has had an assault charge.    Social History:    Substance Abuse:  Tobacco: 1.5ppd  Alcohol: intermittent use upto a 6 pack  Cannabis: history of intermittent use  Methamphetamine: he may have tried it once but never used regularly  Opiate: he notes he started use with norco/percocet in 2016.  now he is using carfentanil  Cocaine: does not use  Synthetic: does not use  IV drug use: denies     Marriages: none  Current Relationships: Relationship with girlfriend of 8yrs  Children: 3 children    Abuse/Trauma: History of physical abuse: no, History of sexual abuse: no and History of verbal/emotional abuse: no    Education: some college   Occupation: fabrication  Living Situation: girlfriend, her grandmother and 3 children    Firearms Access: he has a gun in the house    Social History     Socioeconomic History   • Marital status: Single   Tobacco Use   • Smoking status: Current Every Day Smoker     Packs/day: 0.50     Types: Cigarettes   Substance and Sexual Activity   • Alcohol use: Yes     Alcohol/week: 50.0 standard drinks     Types: 50 Cans of beer per week     Comment: whiskey and beer   • Drug use: Not Currently     Comment: fentanyl use  "since 2016; stopped using two days ago         Family History  No family history on file.    Further details: suicide: denies; A&D: father: meth; mom: opiates; MH: mom has BPAD, anxiety and depression; dad: depression and anxiety.    Past Medical History:    Past Medical History:   Diagnosis Date   • Allergic rhinitis    • Depression    • Eye exam, routine    • URI (upper respiratory infection)      No past surgical history on file.  Allergies:  Patient has no known allergies.    Prior to Admission Medications:  No medications prior to admission.       Medical Review Of Systems:  Reviewed review of systems from  LAURA Pisano's note from today.    Agree with ROS as noted with any relevant updates:    Constitutional: Negative for appetite change, chills, fatigue and fever.   HENT: Negative for congestion.    Eyes: Negative for visual disturbance.   Respiratory: Negative for cough and shortness of breath.    Cardiovascular: Negative for chest pain.   Gastrointestinal: Negative for abdominal pain, diarrhea, nausea and vomiting.   Genitourinary: Negative for difficulty urinating and dysuria.   Musculoskeletal: Negative for arthralgias, back pain, myalgias and neck pain.   Skin: Negative for rash and wound.   Neurological: Negative for dizziness, weakness and headaches.     All other systems reviewed and are negative.    Objective     Vital Signs    Temp:  [97.3 °F (36.3 °C)-99.3 °F (37.4 °C)] 98 °F (36.7 °C)  Heart Rate:  [63-84] 78  Resp:  [16-20] 18  BP: (118-130)/(66-87) 128/87      08/07/22  0330   Weight: 72.6 kg (160 lb)         Physical Exam:   General Appearance: alert, appears stated age and cooperative,  Hygiene:   good  Gait & Station: Normal  Musculoskeletal: No tremors or abnormal involuntary movements    Mental Status Exam:   Cooperation:  Cooperative  Eye Contact:  Good  Psychomotor Behavior:  Appropriate  Mood: \"Fine\"  Affect:  normal  Speech:  Normal  Thought Process:  Coherent  Associations: Goal " Directed  Thought Content:     Normal   Suicidal:  Denies now but ED reports concerning   Homicidal:  None   Hallucinations:  None   Delusion:  None  Cognitive Functioning:   Consciousness: awake and alert   Orientation:  Person, Place, Time and Situation   Attention: normal Concentration: Normal   Language:  Intact Vocabulary: Average   Short Term Memory: Intact   Long Term Memory: Intact   Fund of Knowledge: Average  Reliability:  adequate  Insight:  Fair  Judgement:  Fair  Impulse Control:  Fair    Diagnostic Data:    Lab Results: Results source: EMR   Recent Results (from the past 72 hour(s))   Comprehensive Metabolic Panel    Collection Time: 08/06/22  8:37 PM    Specimen: Blood   Result Value Ref Range    Glucose 110 (H) 65 - 99 mg/dL    BUN 11 6 - 20 mg/dL    Creatinine 0.78 0.76 - 1.27 mg/dL    Sodium 143 136 - 145 mmol/L    Potassium 3.9 3.5 - 5.2 mmol/L    Chloride 108 (H) 98 - 107 mmol/L    CO2 24.0 22.0 - 29.0 mmol/L    Calcium 9.9 8.6 - 10.5 mg/dL    Total Protein 7.4 6.0 - 8.5 g/dL    Albumin 4.80 3.50 - 5.20 g/dL    ALT (SGPT) 56 (H) 1 - 41 U/L    AST (SGOT) 28 1 - 40 U/L    Alkaline Phosphatase 80 39 - 117 U/L    Total Bilirubin 0.6 0.0 - 1.2 mg/dL    Globulin 2.6 gm/dL    A/G Ratio 1.8 g/dL    BUN/Creatinine Ratio 14.1 7.0 - 25.0    Anion Gap 11.0 5.0 - 15.0 mmol/L    eGFR 126.9 >60.0 mL/min/1.73   CBC Auto Differential    Collection Time: 08/06/22  8:37 PM    Specimen: Blood   Result Value Ref Range    WBC 10.97 (H) 3.40 - 10.80 10*3/mm3    RBC 5.43 4.14 - 5.80 10*6/mm3    Hemoglobin 16.3 13.0 - 17.7 g/dL    Hematocrit 46.9 37.5 - 51.0 %    MCV 86.4 79.0 - 97.0 fL    MCH 30.0 26.6 - 33.0 pg    MCHC 34.8 31.5 - 35.7 g/dL    RDW 13.4 12.3 - 15.4 %    RDW-SD 42.2 37.0 - 54.0 fl    MPV 10.0 6.0 - 12.0 fL    Platelets 311 140 - 450 10*3/mm3    Neutrophil % 73.3 42.7 - 76.0 %    Lymphocyte % 16.5 (L) 19.6 - 45.3 %    Monocyte % 8.8 5.0 - 12.0 %    Eosinophil % 0.5 0.3 - 6.2 %    Basophil % 0.5 0.0 - 1.5  %    Immature Grans % 0.4 0.0 - 0.5 %    Neutrophils, Absolute 8.03 (H) 1.70 - 7.00 10*3/mm3    Lymphocytes, Absolute 1.81 0.70 - 3.10 10*3/mm3    Monocytes, Absolute 0.97 (H) 0.10 - 0.90 10*3/mm3    Eosinophils, Absolute 0.06 0.00 - 0.40 10*3/mm3    Basophils, Absolute 0.06 0.00 - 0.20 10*3/mm3    Immature Grans, Absolute 0.04 0.00 - 0.05 10*3/mm3    nRBC 0.0 0.0 - 0.2 /100 WBC   Gold Top - SST    Collection Time: 08/06/22  8:37 PM   Result Value Ref Range    Extra Tube Hold for add-ons.    Light Blue Top    Collection Time: 08/06/22  8:37 PM   Result Value Ref Range    Extra Tube Hold for add-ons.    Urinalysis With Microscopic If Indicated (No Culture) - Urine, Clean Catch    Collection Time: 08/06/22 10:58 PM    Specimen: Urine, Clean Catch   Result Value Ref Range    Color, UA Yellow Yellow, Straw, Dark Yellow, Dang    Appearance, UA Clear Clear    pH, UA 6.5 5.0 - 9.0    Specific Gravity, UA 1.018 1.003 - 1.030    Glucose, UA Negative Negative    Ketones, UA Negative Negative    Bilirubin, UA Negative Negative    Blood, UA Negative Negative    Protein, UA Negative Negative    Leuk Esterase, UA Trace (A) Negative    Nitrite, UA Negative Negative    Urobilinogen, UA 1.0 E.U./dL 0.2 - 1.0 E.U./dL   Urinalysis, Microscopic Only - Urine, Clean Catch    Collection Time: 08/06/22 10:58 PM    Specimen: Urine, Clean Catch   Result Value Ref Range    RBC, UA None Seen None Seen /HPF    WBC, UA 0-2 None Seen, 0-2, 3-5 /HPF    Bacteria, UA None Seen None Seen /HPF    Squamous Epithelial Cells, UA 0-2 None Seen, 0-2 /HPF    Hyaline Casts, UA None Seen None Seen /LPF    Methodology Manual Light Microscopy    Urine Drug Screen - Urine, Clean Catch    Collection Time: 08/07/22 12:06 AM    Specimen: Urine, Clean Catch   Result Value Ref Range    THC, Screen, Urine Negative Negative    Phencyclidine (PCP), Urine Negative Negative    Cocaine Screen, Urine Negative Negative    Methamphetamine, Ur Negative Negative    Opiate  Screen Negative Negative    Amphetamine Screen, Urine Negative Negative    Benzodiazepine Screen, Urine Negative Negative    Tricyclic Antidepressants Screen Negative Negative    Methadone Screen, Urine Negative Negative    Barbiturates Screen, Urine Negative Negative    Oxycodone Screen, Urine Negative Negative    Propoxyphene Screen Negative Negative    Buprenorphine, Screen, Urine Negative Negative   Acetaminophen Level    Collection Time: 08/07/22 12:07 AM    Specimen: Arm, Right; Blood   Result Value Ref Range    Acetaminophen <5.0 0.0 - 30.0 mcg/mL   Ethanol    Collection Time: 08/07/22 12:07 AM    Specimen: Arm, Right; Blood   Result Value Ref Range    Ethanol <10 0 - 10 mg/dL    Ethanol % <0.010 %   Salicylate Level    Collection Time: 08/07/22 12:07 AM    Specimen: Arm, Right; Blood   Result Value Ref Range    Salicylate <0.3 <=30.0 mg/dL   ECG 12 Lead    Collection Time: 08/07/22 12:43 AM   Result Value Ref Range    QT Interval 406 ms    QTC Interval 415 ms   COVID-19 and FLU A/B PCR - Swab, Nasopharynx    Collection Time: 08/07/22 12:51 AM    Specimen: Nasopharynx; Swab   Result Value Ref Range    COVID19 Not Detected Not Detected - Ref. Range    Influenza A PCR Not Detected Not Detected    Influenza B PCR Not Detected Not Detected       No results found for: GLUF     No results found for: HGBA1C    No results found for: CHOL, TRIG, HDL, LDL, VLDL, LDLHDL     No results found for: TSH    No results found for: FREET4    No results found for: IGCQ71EG, IVPLAZPX99, FOLATE    No results found for: HCGQUAL    Imaging Results:  No results found.      Patient Strengths: ability for insight, average or above intelligence, communication skills     Patient Barriers: substance abuse    Assessment & Plan       Depression    Suicidal ideation    Opioid use disorder, severe, dependence (HCC)    Opioid withdrawal (HCC)      Impression: Patient admitted for imminent risk of harm to self as evidenced by suicidal  statements and self injury in the last few days while in withdrawal.    Treatment Plan:    1) Will admit patient to the behavioral health unit at [facility] to ensure patient safety.  2) Patient will be provided treatment with the unit milieu, activities, therapies and psychopharmacological management.  3) Patient placed on  Q15 minute checks  and Suicide precautions.  4) Hospitalist consulted for assistance in management of medical comorbidities.  5) Reviewed lab results as noted above.  Will order following labs: Vit D, Vit B-12, Folate, TSH, Free T-4   6) Will restart patient on the following psychiatric home meds: none  7) Will make the following medication changes:   --COWs for opiate withdrawal.  --Wellbutrin xl to 150mg daily for depression and adhd history.  --Requip 0.5mg qhs for restless leg issues.  --Ambien 5mg qhs and qhs PRN for insomnia.  --Naltrexone 25mg once as a trial dose.  Start 50 if no worsening of withdrawal.  8) Will begin discharge planning for patient: outpatient psychiatric care, outpatient medical care, safety planning and placement as appropriate.    Treatment plan and medication risks and benefits discussed with: Patient      Estimated Length of Stay: 2-3 days  Prognosis: fair     Psychotherapy Note  --Total Psychotherapy Time: 20 minutes  --Participants: Patient, myself  --Focus of Therapeutic Encounter: opioid use   --Intervention Type: Supportive and Psycho-Educational  --Therapy notes: I provided reflective listening, supportive therapy, reflection, and allowed them to express affect in therapy course.  Discussed addiction, depression and treatment options.  --DX: as above  --Plan: Continue to work on developing & strengthening coping skills; correcting maladaptive schema;   --Post therapy status: improving affect    Nilesh Rosa MD  08/07/22  16:58 CDT

## 2022-08-07 NOTE — NURSING NOTE
"Inpatient Psychiatry Initial Intake    8/7/2022    Scooter Watson, a 26 y.o. male, for initial evaluation visit.  Patient is referred by Dr. Ortiz.    Patient information was obtained from patient.  Patient presented voluntarily to the Emergency Department.  Precipitant and chief complaint: According to He,  \"I'm withdrawing from Carfentanil. It's a synthetic type of fentanyl and it's 1000x stronger than heroine and morphine, they use it to tranquilize elephants\". When asked about SI, patient states \"Maybe if I don't get any sleep, I'm going on 3-4 days of no sleep\". Patient states that he got mad a few days ago (8/4) because he told himself he was not going to use and then gave in, and ended up cutting his right leg approx 20 times out of anger. He states this is when he made a comment to his family that he had thoughts of wanting to harm himself. Patient states \"I would never do it, I have 3 boys at home\". Patient states that he has been using fentanyl since [id number] and last use on was 8/4. He states that he has been having severe muscle spasms/restless legs, vomiting, diarrhea, headache, sweats and has not been able to eat or drink. Patient small meal while in ED and stated this was the first time he had held any food down since Thursday. Patient states \"I just want a hot shower, to sleep, detox, then me and my dad are going to [place] to get away from here and find some work. I need to get out of this town and the people and fake friends I'm dealing with\". Patient states he has been diagnosed with ADHD, anxiety, depression. He has seen Dr. Murry and was referred to psych APRN, with an appt set up for October 18th. He is unsure of who it is or where. Patient states that he is unsure if he is still currently on probation from previous receiving stolen property charges.      Patient presents with depression, suicidal thoughts/threats and withdrawal.   Onset of symptoms was gradual starting a few " days ago.  Patient states symptoms have been exacerbated by chemical dependency.      Current Medications:  Scheduled Meds:    PRN Meds: •  [COMPLETED] Insert peripheral IV **AND** sodium chloride    History:     Past Psychiatric History:   Previous therapy: no  Previous psychiatric treatment and medication trials:  no  Previous psychiatric hospitalizations:  no  Previous diagnoses:     yes - ADHD, anxiety/depression  Previous suicide attempts:    no  Previous homicide attempts:    no  Family history of suicide:    no  Previous history of abuse:     no         History of physical abuse: no        Domestic Abuse: No  History of legal issues and violence: The patient has current pending legal charges for receiving stolen property. The patient has a history of violence.  Patient states he has had an assault charge.  Currently in treatment with Dr. Murry PCP.  Education: some college  Other pertinent history: None    Current Evaluation:     Mental Status Evaluation:  Appearance:  age appropriate   Behavior:  restless and fidgety   Speech:  normal pitch and normal volume   Mood:  anxious   Affect:  mood-congruent   Thought Process:  normal   Thought Content:  normal   Sensorium:  person, place, time/date, situation, day of week, month of year and year   Cognition:  grossly intact   Insight:  good   Judgment:  fair         Psychiatric Review Of Systems:  Sleep: no sleep in 3-4 days   Appetite changes: yes  Weight changes: no  Energy: no  Interest/pleasure/anhedonia: no  Libido: no  Sexual orientation:  heterosexual  Anxiety/panic: yes  Guilty/hopeless: yes  Self-injurious behavior/risky behavior: yes    Stressors: drug     Substance Abuse History:     Substance Abuse History:  Tobacco use: yes -    Use of alcohol: no  Recreational drugs: synthetic fentanyl   Use of OTC medications: Tylenol PM, melatonin  Hx of Overdose:  no  Hx of Blackouts: yes -    Past attempts to quit or limit use?:yes -    Patient feels he has  "mental, emotional, or medical problems co-occurred or worsened as a result of alcohol and/or drug use: yes -      Suicide Risk Screening:     Suicidal Ideation:  Current thoughts of suicide?no, states \"I may think about it again if I don't get any sleep\"  Recent thoughts of suicide?yes -      Suicide Planning:  Specific Plan?no  Thought Content/Patients own words: \"I might have said something a few days ago, I was mad because I used again, but I know I wouldn't do that to my sons\".  Potentially lethal means?yes -    Access to gun?yes -    Access to other lethal means?no    Suicide Attempt:  Recent attempt?no  Past attempt?no  Cutting/burning/self-mutilation?yes -        Protective Factors:  Family, sons, girlfriend    Homicide Risk Screening:     Homicidal Ideation:  Current thoughts of homicide:  no  Recent thoughts of homicide:  no    Homicidal Planning:  Specific Plan?  no  Thought Content/Patients own words: denies.  Access/Means?  no    Perceptual Disturbances     Auditory:  no   Hallucinations continued:       Hypnopompic hallucinations? no   Hypnagogic hallucinations?  no      Delusions? no      Shared Delusion no        Recommendations:     Reviewed with: Dr. Rosa  Medically cleared by: Dr. Ortiz   Admit to Inpatient Behavioral Health Unit: yes -  routine orders, COWS      Airam Barry RN  "

## 2022-08-07 NOTE — PLAN OF CARE
Goal Outcome Evaluation:  Plan of Care Reviewed With: patient  Patient Agreement with Plan of Care: agrees     Progress: no change  Outcome Evaluation: new admission

## 2022-08-07 NOTE — NURSING NOTE
Behavior   Note any precipitants to event or behavior   Describe level and action of any aggressive behavior or speech and associated interventions.     Anxiety: Patient denies at this time  Depression: depressed mood, fatigue and hopelessness  Pain  0  AVH   no  S/I   no  Plan  no  H/I   no  Plan  no    Affect   euthymic/normal      Note: Pt noted to be lying in bed this am with c/o not sleeping well due to moving of legs. Pt said that his legs constantly move et when this nurse pointed out that his legs were no longer moving, he looked surprised.  He says that he is through withdraws et is on day 4 of no Carfentanil. Pt says that he is ready for a change et has 3 boys ages 6, 2 et 9 months et possibly another child that the mother will not allow a DNA test. Pt says that he wants to get clean for his children et has tried before et relapsed every time. He says that he has made it past 4 days but relapsed. When asked why he began using drugs, he said that he started when he was younger et has just continued over the years. When this nurse asked if something had happened during his childhood that made him want to escape, pt began to laugh et did not answer. He denies SI, HI et hallucinations at this time.       Intervention    PRN medication utilized:  no    Instructed in medication usage and effects  Medications administered as ordered  Encouraged to verbalize needs      Response    Verbalized understanding   Did patient take medications as ordered yes   Did patient interact with assessment?  yes     Plan    Will monitor for safety  Will monitor every 15 minutes as ordered  Will evaluate and promote the plan of care    Last BM:  unknown date  (Please chart in I/O as well)

## 2022-08-07 NOTE — CONSULTS
[facility] Medicine Consult  Inpatient Hospitalist Consult  Consult performed by: Roel Pisano APRN  Consult ordered by: Nilesh Rosa MD          Date of Admission: 8/7/2022  Date of Consult: 08/07/22    Primary Care Physician: Provider, No Known  Referring Physician:  Nilesh Rosa MD      Chief Complaint/Reason for Consultation: Medical co-management, suicidal ideation    HPI:  This is a 26 year old male without significant medical history who is admitted to behavioral health due to suicidal ideations.  He complains of difficulty sleeping.    Past Medical History:   Past Medical History:   Diagnosis Date   • Allergic rhinitis    • Depression    • Eye exam, routine    • URI (upper respiratory infection)        Past Surgical History: No past surgical history on file. Denies surgical history.    Family History: Negative for heart disease or cancer.     Social History:   Social History     Socioeconomic History   • Marital status: Single   Tobacco Use   • Smoking status: Current Every Day Smoker     Packs/day: 0.50     Types: Cigarettes   Substance and Sexual Activity   • Alcohol use: Yes     Alcohol/week: 50.0 standard drinks     Types: 50 Cans of beer per week     Comment: whiskey and beer   • Drug use: Not Currently     Comment: fentanyl use since 2016; stopped using two days ago       Allergies: No Known Allergies    Medications:   Current Facility-Administered Medications:   •  acetaminophen (TYLENOL) tablet 650 mg, 650 mg, Oral, Q4H PRN, Nilesh Rosa MD, 650 mg at 08/07/22 0358  •  aluminum-magnesium hydroxide-simethicone (MAALOX MAX) 400-400-40 MG/5ML suspension 15 mL, 15 mL, Oral, Q6H PRN, Nilesh Rosa MD  •  cloNIDine (CATAPRES) tablet 0.1 mg, 0.1 mg, Oral, 4x Daily PRN **FOLLOWED BY** [START ON 8/8/2022] cloNIDine (CATAPRES) tablet 0.1 mg, 0.1 mg, Oral, 4x Daily PRN **FOLLOWED BY** [START ON 8/9/2022] cloNIDine  (CATAPRES) tablet 0.1 mg, 0.1 mg, Oral, TID PRN **FOLLOWED BY** [START ON 8/10/2022] cloNIDine (CATAPRES) tablet 0.1 mg, 0.1 mg, Oral, BID PRN **FOLLOWED BY** [START ON 8/11/2022] cloNIDine (CATAPRES) tablet 0.1 mg, 0.1 mg, Oral, Once PRN, Nilesh Rosa MD  •  hydrOXYzine pamoate (VISTARIL) capsule 50 mg, 50 mg, Oral, Q6H PRN, Nilesh Rosa MD, 50 mg at 08/07/22 0358  •  loperamide (IMODIUM) capsule 2 mg, 2 mg, Oral, Q2H PRN, Nilesh Rosa MD  •  magnesium hydroxide (MILK OF MAGNESIA) suspension 10 mL, 10 mL, Oral, Daily PRN, Nilesh Rosa MD  •  nicotine (NICODERM CQ) 21 MG/24HR patch 1 patch, 1 patch, Transdermal, Q24H, Nilesh Rosa MD, 1 patch at 08/07/22 0837  •  ondansetron ODT (ZOFRAN-ODT) disintegrating tablet 4 mg, 4 mg, Oral, Q6H PRN, Nilesh Rosa MD, 4 mg at 08/07/22 0358  •  traZODone (DESYREL) tablet 50 mg, 50 mg, Oral, Nightly PRN, Nilesh Rosa MD    Review of Systems:  Review of Systems   Constitutional: Negative for appetite change, chills, fatigue and fever.   HENT: Negative for congestion.    Eyes: Negative for visual disturbance.   Respiratory: Negative for cough and shortness of breath.    Cardiovascular: Negative for chest pain.   Gastrointestinal: Negative for abdominal pain, diarrhea, nausea and vomiting.   Genitourinary: Negative for difficulty urinating and dysuria.   Musculoskeletal: Negative for arthralgias, back pain, myalgias and neck pain.   Skin: Negative for rash and wound.   Neurological: Negative for dizziness, weakness and headaches.   All other systems reviewed and are negative.     Otherwise complete ROS is negative except as mentioned above.    Physical Exam:   Temp:  [97.3 °F (36.3 °C)-99.3 °F (37.4 °C)] 99.3 °F (37.4 °C)  Heart Rate:  [63-84] 64  Resp:  [16-20] 16  BP: (118-130)/(66-82) 130/72  Physical Exam  Vitals reviewed.   Constitutional:       General: He is not in acute distress.     Appearance: Normal appearance. He is  well-developed. He is not diaphoretic.   HENT:      Head: Normocephalic and atraumatic.   Eyes:      Conjunctiva/sclera: Conjunctivae normal.   Cardiovascular:      Rate and Rhythm: Normal rate and regular rhythm.      Heart sounds: No murmur heard.    No friction rub. No gallop.   Pulmonary:      Effort: Pulmonary effort is normal. No respiratory distress.      Breath sounds: Normal breath sounds. No wheezing or rales.   Chest:      Chest wall: No tenderness.   Abdominal:      General: Bowel sounds are normal. There is no distension.      Palpations: Abdomen is soft.      Tenderness: There is no abdominal tenderness.   Musculoskeletal:         General: No swelling or deformity. Normal range of motion.      Cervical back: Normal range of motion and neck supple.   Skin:     General: Skin is warm and dry.      Findings: No erythema.   Neurological:      General: No focal deficit present.      Mental Status: He is alert and oriented to person, place, and time.      Comments: CN I: Sense of smell intact  CN II: Visual fields intact  CN III,IV,VI: extraocular movements intact  CN V: [place] strength and sensation in all three divisions intact  CN VII: Smile and eyelid closure symmetrical  CN VIII: Hearing intact  CN IX and X: Voice and palate movement intact  CN XI: Shoulder shrug intact  CN XII: Tongue protrusion and movement intact           Results Reviewed:  I have personally reviewed current lab, radiology, and data and agree with results.  Lab Results (last 24 hours)     ** No results found for the last 24 hours. **        Imaging Results (Last 24 Hours)     ** No results found for the last 24 hours. **          Assessment:    Suicidal ideation            Recommendations:  1. Psychiatric management per primary team  2. No home medications    Will sign off.  Please do not hesitate to call with questions or changes in the patients condition. Thank you.    I confirmed that the patient's Advance Care Plan is present,  code status is documented, or surrogate decision maker is listed in the patient's medical record.     I have utilized all available immediate resources to obtain, update, or review the patient's current medications.       Roel Pisano, APRN  08/07/22  10:47 CDT

## 2022-08-08 VITALS
HEART RATE: 73 BPM | HEIGHT: 71 IN | WEIGHT: 160 LBS | SYSTOLIC BLOOD PRESSURE: 117 MMHG | RESPIRATION RATE: 20 BRPM | OXYGEN SATURATION: 99 % | TEMPERATURE: 97.8 F | BODY MASS INDEX: 22.4 KG/M2 | DIASTOLIC BLOOD PRESSURE: 78 MMHG

## 2022-08-08 PROBLEM — R45.851 SUICIDAL IDEATION: Status: RESOLVED | Noted: 2022-08-07 | Resolved: 2022-08-08

## 2022-08-08 LAB
CHOLEST SERPL-MCNC: 235 MG/DL (ref 0–200)
GLUCOSE P FAST SERPL-MCNC: 104 MG/DL (ref 74–106)
HDLC SERPL-MCNC: 47 MG/DL (ref 40–60)
LDLC SERPL CALC-MCNC: 163 MG/DL (ref 0–100)
LDLC/HDLC SERPL: 3.42 {RATIO}
TRIGL SERPL-MCNC: 137 MG/DL (ref 0–150)
VLDLC SERPL-MCNC: 25 MG/DL (ref 5–40)

## 2022-08-08 PROCEDURE — 82947 ASSAY GLUCOSE BLOOD QUANT: CPT | Performed by: PSYCHIATRY & NEUROLOGY

## 2022-08-08 PROCEDURE — 63710000001 ONDANSETRON ODT 4 MG TABLET DISPERSIBLE: Performed by: PSYCHIATRY & NEUROLOGY

## 2022-08-08 PROCEDURE — 99238 HOSP IP/OBS DSCHRG MGMT 30/<: CPT | Performed by: PSYCHIATRY & NEUROLOGY

## 2022-08-08 PROCEDURE — 80061 LIPID PANEL: CPT | Performed by: PSYCHIATRY & NEUROLOGY

## 2022-08-08 RX ORDER — NALTREXONE HYDROCHLORIDE 50 MG/1
50 TABLET, FILM COATED ORAL DAILY
Qty: 30 TABLET | Refills: 1 | Status: SHIPPED | OUTPATIENT
Start: 2022-08-08

## 2022-08-08 RX ORDER — ROPINIROLE 0.5 MG/1
0.5 TABLET, FILM COATED ORAL NIGHTLY
Qty: 2 TABLET | Refills: 0 | Status: SHIPPED | OUTPATIENT
Start: 2022-08-08

## 2022-08-08 RX ORDER — BUPROPION HYDROCHLORIDE 150 MG/1
150 TABLET ORAL DAILY
Qty: 30 TABLET | Refills: 1 | Status: SHIPPED | OUTPATIENT
Start: 2022-08-09

## 2022-08-08 RX ORDER — ZOLPIDEM TARTRATE 10 MG/1
10 TABLET ORAL NIGHTLY PRN
Qty: 2 TABLET | Refills: 0 | Status: SHIPPED | OUTPATIENT
Start: 2022-08-08

## 2022-08-08 RX ADMIN — HYDROXYZINE PAMOATE 50 MG: 50 CAPSULE ORAL at 08:22

## 2022-08-08 RX ADMIN — NICOTINE 1 PATCH: 21 PATCH, EXTENDED RELEASE TRANSDERMAL at 08:21

## 2022-08-08 RX ADMIN — ONDANSETRON 4 MG: 4 TABLET, ORALLY DISINTEGRATING ORAL at 12:19

## 2022-08-08 RX ADMIN — BUPROPION HYDROCHLORIDE 150 MG: 150 TABLET, FILM COATED, EXTENDED RELEASE ORAL at 08:21

## 2022-08-08 NOTE — PROGRESS NOTES
Called pt's father, with pt's consent. Pt's father, stated they will be going to [place] or [place] for work. Pt's father does not know the date they'd be leaving, possibly around the end of the week. 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 Father brought pt to ED due to depression and pt going through withdrawal. Father states, pt will be picked up by him, and then pt will be going to stay with him. Father reports there is a gun in the home, and father will make sure gun is put up and father will be with pt. Father is in agreement for  today. Assisted father of the patient in identifying risk factors which would indicate the need for higher level of care for the patient including thoughts to harm self or others and/or self-harming behavior and encouraged patient to  call 911, or present to the nearest emergency room should any of these events occur. Discussed crisis intervention services and means to access.  Father verbalized understanding of topics discussed.

## 2022-08-08 NOTE — NURSING NOTE
Patient is alert and oriented x 4. Patient is ambulatory at discharge. Personal belongings returned from [facility] and security. Medications and follow up appointments reviewed with patient prior to discharge. AVS and safety plan provided. Patient's father here to transport home.

## 2022-08-08 NOTE — PROGRESS NOTES
Discharge and Safety Planning    Met with pt and discussed discharge planning today. Pt denies SI\HI\AVH at this time.   Pt rates depression 0 /10, and anxiety  0 /10 at this time.  Pt's plan for discharge is home with father  Pt will follow-up with  [facility]  for after care and treatment. MSW discussed the importance of following up with mental health providers.   MSW and pt discussed the crisis line and provided pt with number to access. The number for the National Suicide & Crisis Hotline is 1-174.497.1340.  The National Crisis Text number is 366114.    Pt and MSW discussed learned coping skills from attending groups and ind sessions. Pt verbalized they would use the following coping skills in need, deep breathing, walking away, calling a friend, going to the ED, and using the crisis line. Assisted patient in identifying risk factors which would indicate the need for higher level of care including thoughts to harm self or others and/or self-harming behavior and encouraged patient to  call 911, or present to the nearest emergency room should any of these events occur. Discussed crisis intervention services and means to access.  Patient adamantly and convincingly denies current suicidal or homicidal ideation or perceptual disturbance.  Pt does not have access to fire arms/weapons or dirk, verified through safety planning.   Safety planning completed with Father and pt.    Pt verbalized understanding of topics discussed. Pt had no further questions for this provider.

## 2022-08-08 NOTE — NURSING NOTE
Behavior   Note any precipitants to event or behavior   Describe level and action of any aggressive behavior or speech and associated interventions.     Anxiety: Patient denies at this time  Depression: depressed mood and fatigue  Pain  0  AVH   no  S/I   no  Plan  no  H/I   no  Plan  no    Affect   euthymic/normal      Note: Patient took shower before medications.  States not sleeping well, explained that he can have another med, after he gives this one a chance to work.  Said requip did help his  Legs.  Will continue to monitor      Intervention    PRN medication utilized:  yes - ambein and vistaril    Instructed in medication usage and effects  Medications administered as ordered  Encouraged to verbalize needs      Response    Verbalized understanding   Did patient take medications as ordered yes   Did patient interact with assessment?  yes     Plan    Will monitor for safety  Will monitor every 15 minutes as ordered  Will evaluate and promote the plan of care    Last BM:  unknown date  (Please chart in I/O as well)

## 2022-08-08 NOTE — DISCHARGE SUMMARY
"Admission Date: 8/7/2022    Discharge Date: 08/08/22    Psychiatric History:   Patient is a 26 y.o. male who presents with suicidal ideation. Onset of symptoms was abrupt starting a few days ago.  Symptoms have been present on an intermittent basis. Symptoms are associated with anxiety, insomnia, depressed mood and opiate withdrawal.  Symptoms are aggravated by problems with substance abuse.   Patient's symptoms occur in the context of no prior psych admission or suicide attempts.     Patient presented to the ED late last night due to having withdrawal issues from carfentanil.  He had stopped using 2-3 days prior and was having s/s of opiate withdrawal.  His family reported that he had expressed SI and texted wife that he was going to kill himself.  He admitted to cutting his right leg out of anger on 8/4/22.     From Dr. Camacho's ED note:  Family at bedside stating that she feels the patient is suicidal.  She states he has been writing notes to his wife on his phone saying how he is going to kill himself.  Patient denies wanting to hurt himself right now.  He had cut himself a couple of days ago...     From Nurse Barry's intake assessment:  When asked about SI, patient states \"Maybe if I don't get any sleep, I'm going on 3-4 days of no sleep\". Patient states that he got mad a few days ago (8/4) because he told himself he was not going to use and then gave in, and ended up cutting his right leg approx 20 times out of anger. He states this is when he made a comment to his family that he had thoughts of wanting to harm himself. Patient states \"I would never do it, I have 3 boys at home\".      Patient today notes that his withdrawal is better and he is more hopeful and denies suicidal thoughts.  He minimizes concerns from the ED.  He notes interest in sobriety and notes prior trial of suboxone and he would like to not use an opiate.  He is amenable to trying naltrexone.  He notes continues restless leg and insomnia " issues from the opiate withdrawal.  He asks to go ahead and trial naltrexone tonight.     Psychiatric Review Of Systems:  depression, sleep disturbance, suicidal ideations and Pertinent items are noted in HPI.     Past Psychiatric History:     Psychiatric Hospitalizations: Patient has had no prior hospitalizations.     Suicide Attempts: Patient has had no prior suicide attempts.     Prior Treatment and Medications Tried: Treated for ADHD, anxiety and depression.  Given zoloft and risperdal by Dr. Murry in April and then paxil in June.     History of violence or legal issues: The patient has current pending legal charges for receiving stolen property. The patient has a history of violence.  Patient states he has had an assault charge.     Social History:     Substance Abuse:  Tobacco: 1.5ppd  Alcohol: intermittent use upto a 6 pack  Cannabis: history of intermittent use  Methamphetamine: he may have tried it once but never used regularly  Opiate: he notes he started use with norco/percocet in 2016.  now he is using carfentanil  Cocaine: does not use  Synthetic: does not use  IV drug use: denies      Marriages: none  Current Relationships: Relationship with girlfriend of 8yrs  Children: 3 children     Abuse/Trauma: History of physical abuse: no, History of sexual abuse: no and History of verbal/emotional abuse: no     Education: some college   Occupation: fabrication  Living Situation: girlfriend, her grandmother and 3 children     Firearms Access: he has a gun in the house     Social History   Social History            Socioeconomic History   • Marital status: Single   Tobacco Use   • Smoking status: Current Every Day Smoker       Packs/day: 0.50       Types: Cigarettes   Substance and Sexual Activity   • Alcohol use: Yes       Alcohol/week: 50.0 standard drinks       Types: 50 Cans of beer per week       Comment: whiskey and beer   • Drug use: Not Currently       Comment: fentanyl use since 2016; stopped using  two days ago               Family History  No family history on file.     Further details: suicide: denies; A&D: father: meth; mom: opiates; MH: mom has BPAD, anxiety and depression; dad: depression and anxiety.     Past Medical History:     Medical History        Past Medical History:   Diagnosis Date   • Allergic rhinitis     • Depression     • Eye exam, routine     • URI (upper respiratory infection)           Surgical History   No past surgical history on file.     Allergies:  Patient has no known allergies.     Prior to Admission Medications:  Prescriptions Prior to Admission   No medications prior to admission.            Diagnostic Data:    Lab Results: Results source: EMR   Recent Results (from the past 168 hour(s))   Comprehensive Metabolic Panel    Collection Time: 08/06/22  8:37 PM    Specimen: Blood   Result Value Ref Range    Glucose 110 (H) 65 - 99 mg/dL    BUN 11 6 - 20 mg/dL    Creatinine 0.78 0.76 - 1.27 mg/dL    Sodium 143 136 - 145 mmol/L    Potassium 3.9 3.5 - 5.2 mmol/L    Chloride 108 (H) 98 - 107 mmol/L    CO2 24.0 22.0 - 29.0 mmol/L    Calcium 9.9 8.6 - 10.5 mg/dL    Total Protein 7.4 6.0 - 8.5 g/dL    Albumin 4.80 3.50 - 5.20 g/dL    ALT (SGPT) 56 (H) 1 - 41 U/L    AST (SGOT) 28 1 - 40 U/L    Alkaline Phosphatase 80 39 - 117 U/L    Total Bilirubin 0.6 0.0 - 1.2 mg/dL    Globulin 2.6 gm/dL    A/G Ratio 1.8 g/dL    BUN/Creatinine Ratio 14.1 7.0 - 25.0    Anion Gap 11.0 5.0 - 15.0 mmol/L    eGFR 126.9 >60.0 mL/min/1.73   CBC Auto Differential    Collection Time: 08/06/22  8:37 PM    Specimen: Blood   Result Value Ref Range    WBC 10.97 (H) 3.40 - 10.80 10*3/mm3    RBC 5.43 4.14 - 5.80 10*6/mm3    Hemoglobin 16.3 13.0 - 17.7 g/dL    Hematocrit 46.9 37.5 - 51.0 %    MCV 86.4 79.0 - 97.0 fL    MCH 30.0 26.6 - 33.0 pg    MCHC 34.8 31.5 - 35.7 g/dL    RDW 13.4 12.3 - 15.4 %    RDW-SD 42.2 37.0 - 54.0 fl    MPV 10.0 6.0 - 12.0 fL    Platelets 311 140 - 450 10*3/mm3    Neutrophil % 73.3 42.7 - 76.0 %     Lymphocyte % 16.5 (L) 19.6 - 45.3 %    Monocyte % 8.8 5.0 - 12.0 %    Eosinophil % 0.5 0.3 - 6.2 %    Basophil % 0.5 0.0 - 1.5 %    Immature Grans % 0.4 0.0 - 0.5 %    Neutrophils, Absolute 8.03 (H) 1.70 - 7.00 10*3/mm3    Lymphocytes, Absolute 1.81 0.70 - 3.10 10*3/mm3    Monocytes, Absolute 0.97 (H) 0.10 - 0.90 10*3/mm3    Eosinophils, Absolute 0.06 0.00 - 0.40 10*3/mm3    Basophils, Absolute 0.06 0.00 - 0.20 10*3/mm3    Immature Grans, Absolute 0.04 0.00 - 0.05 10*3/mm3    nRBC 0.0 0.0 - 0.2 /100 WBC   Gold Top - SST    Collection Time: 08/06/22  8:37 PM   Result Value Ref Range    Extra Tube Hold for add-ons.    Light Blue Top    Collection Time: 08/06/22  8:37 PM   Result Value Ref Range    Extra Tube Hold for add-ons.    Folate    Collection Time: 08/06/22  8:37 PM    Specimen: Blood   Result Value Ref Range    Folate 10.60 4.78 - 24.20 ng/mL   Vitamin D 25 Hydroxy    Collection Time: 08/06/22  8:37 PM    Specimen: Blood   Result Value Ref Range    25 Hydroxy, Vitamin D 34.8 30.0 - 100.0 ng/ml   Vitamin B12    Collection Time: 08/06/22  8:37 PM    Specimen: Blood   Result Value Ref Range    Vitamin B-12 483 211 - 946 pg/mL   Urinalysis With Microscopic If Indicated (No Culture) - Urine, Clean Catch    Collection Time: 08/06/22 10:58 PM    Specimen: Urine, Clean Catch   Result Value Ref Range    Color, UA Yellow Yellow, Straw, Dark Yellow, Dang    Appearance, UA Clear Clear    pH, UA 6.5 5.0 - 9.0    Specific Gravity, UA 1.018 1.003 - 1.030    Glucose, UA Negative Negative    Ketones, UA Negative Negative    Bilirubin, UA Negative Negative    Blood, UA Negative Negative    Protein, UA Negative Negative    Leuk Esterase, UA Trace (A) Negative    Nitrite, UA Negative Negative    Urobilinogen, UA 1.0 E.U./dL 0.2 - 1.0 E.U./dL   Urinalysis, Microscopic Only - Urine, Clean Catch    Collection Time: 08/06/22 10:58 PM    Specimen: Urine, Clean Catch   Result Value Ref Range    RBC, UA None Seen None Seen /HPF     WBC, UA 0-2 None Seen, 0-2, 3-5 /HPF    Bacteria, UA None Seen None Seen /HPF    Squamous Epithelial Cells, UA 0-2 None Seen, 0-2 /HPF    Hyaline Casts, UA None Seen None Seen /LPF    Methodology Manual Light Microscopy    Urine Drug Screen - Urine, Clean Catch    Collection Time: 08/07/22 12:06 AM    Specimen: Urine, Clean Catch   Result Value Ref Range    THC, Screen, Urine Negative Negative    Phencyclidine (PCP), Urine Negative Negative    Cocaine Screen, Urine Negative Negative    Methamphetamine, Ur Negative Negative    Opiate Screen Negative Negative    Amphetamine Screen, Urine Negative Negative    Benzodiazepine Screen, Urine Negative Negative    Tricyclic Antidepressants Screen Negative Negative    Methadone Screen, Urine Negative Negative    Barbiturates Screen, Urine Negative Negative    Oxycodone Screen, Urine Negative Negative    Propoxyphene Screen Negative Negative    Buprenorphine, Screen, Urine Negative Negative   Acetaminophen Level    Collection Time: 08/07/22 12:07 AM    Specimen: Arm, Right; Blood   Result Value Ref Range    Acetaminophen <5.0 0.0 - 30.0 mcg/mL   Ethanol    Collection Time: 08/07/22 12:07 AM    Specimen: Arm, Right; Blood   Result Value Ref Range    Ethanol <10 0 - 10 mg/dL    Ethanol % <0.010 %   Salicylate Level    Collection Time: 08/07/22 12:07 AM    Specimen: Arm, Right; Blood   Result Value Ref Range    Salicylate <0.3 <=30.0 mg/dL   TSH    Collection Time: 08/07/22 12:07 AM    Specimen: Arm, Right; Blood   Result Value Ref Range    TSH 0.170 (L) 0.270 - 4.200 uIU/mL   T4, Free    Collection Time: 08/07/22 12:07 AM    Specimen: Arm, Right; Blood   Result Value Ref Range    Free T4 1.04 0.93 - 1.70 ng/dL   ECG 12 Lead    Collection Time: 08/07/22 12:43 AM   Result Value Ref Range    QT Interval 406 ms    QTC Interval 415 ms   COVID-19 and FLU A/B PCR - Swab, Nasopharynx    Collection Time: 08/07/22 12:51 AM    Specimen: Nasopharynx; Swab   Result Value Ref Range    COVID19  Not Detected Not Detected - Ref. Range    Influenza A PCR Not Detected Not Detected    Influenza B PCR Not Detected Not Detected   Glucose, Fasting    Collection Time: 08/08/22  6:47 AM    Specimen: Blood   Result Value Ref Range    Glucose, Fasting 104 74 - 106 mg/dL   Lipid Panel    Collection Time: 08/08/22  6:47 AM    Specimen: Blood   Result Value Ref Range    Total Cholesterol 235 (H) 0 - 200 mg/dL    Triglycerides 137 0 - 150 mg/dL    HDL Cholesterol 47 40 - 60 mg/dL    LDL Cholesterol  163 (H) 0 - 100 mg/dL    VLDL Cholesterol 25 5 - 40 mg/dL    LDL/HDL Ratio 3.42        Radiology Results:  No results found.    Summary of Hospital Course:  Patient was admitted to the behavioral health unit at [facility] to ensure patient safety.  Patient was provided treatment with the unit milieu, activities, therapies and psychopharmacological management.  Patient was placed on Q15 minute checks and Suicide precautions.    Hospitalist was consulted for management of medical co-morbidities.    Patient was restarted on the following psychiatric medications: none at home.    The following medication changes were made during the hospital stay:   --Started wellbutrin xl 150mg daily for depression and history of ADHD.  --Started COWs for opiate withdrawal.  --Started ambien PRN for insomnia due to opiate withdrawal.  Gave a two day supply at discharge for any residual detox symptoms.  --Started requip 0.5mg qhs for restless leg from opiate withdrawal.  Gave a two day supply at discharge for any residual detox symptoms.  --Start naltrexone 50mg daily for opiate cravings.    Patient had improvement over the course of the hospital stay and tolerated his medications.  Patient had improvement in mood, affect and opiate withdrawal and resolution of suicidal thoughts.    Patient's father agreed to remove gun from home and secure environment from firearms and other weapons.    Social work and nursing communicated with  family as needed.    Substance abuse issues were present.  Patient was amenable to naltrexone for opiate cravings.  He was amenable to outpatient care but noted that he and dad planned to move to [place] for work and to get away from the local drug scene.    Patients Condition at Discharge:  Patient is stable for discharge and is not an imminent threat to self or others.  The patient's behavior was Appropriate.  Patient reported that mood was Euthymic.  Patient's affect was normal.  Patient's thought content was as follows:   Suicidal:  Patient denies any suicidal thoughts, plans or intentions.   Homicidal:  Patient denies any homicidal thoughts, plans or intentions.   Hallucinations:  None   Delusion:  None    Discharge Diagnosis:    Depression    Opioid use disorder, severe, dependence (HCC)    Opioid withdrawal ([facility])      Discharge Medications:      Your medication list        START taking these medications        Instructions Last Dose Given Next Dose Due   buPROPion  MG 24 hr tablet  Commonly known as: WELLBUTRIN XL  Start taking on: August 9, 2022      Take 1 tablet by mouth Daily. Indications: Attention Deficit Hyperactivity Disorder, Depression       naltrexone 50 MG tablet  Commonly known as: DEPADE      Take 1 tablet by mouth Daily. Indications: Opioid Dependence       rOPINIRole 0.5 MG tablet  Commonly known as: REQUIP      Take 1 tablet by mouth Every Night. Take 1 hour before bedtime.  Indications: restless leg issues from opioid withdrawal       zolpidem 10 MG tablet  Commonly known as: AMBIEN      Take 1 tablet by mouth At Night As Needed for Sleep. Indications: Trouble Sleeping                 Where to Get Your Medications        These medications were sent to [id number] - [place], [place] - [address] AT [facility] [address] - [phone] [facility] [phone]   [address] [address]      Phone: [phone]   · buPROPion  MG 24 hr  tablet  · naltrexone 50 MG tablet  · rOPINIRole 0.5 MG tablet  · zolpidem 10 MG tablet         Discharge Diet:   Diet Order   Procedures   • Diet Regular       Activity at Discharge: As tolerated.    Justification for multiple antipsychotic medications at discharge:  Not Applicable.    Medication for smoking cessation: Patient declines prescriptions of any cessation agents.    Medication for substance abuse: Patient agrees to prescription of naltrexone    Disposition: Patient was discharged home with family.    Additional Instructions for the Follow-ups that You Need to Schedule    No follow up test          Follow-up Information       Provider, No Known .    Contact information:  [facility] [address]               [facility]. Go on 8/9/2022.    Why: You will need to go to [facility] at 9am for a walk-in apt, for outpatient services., Please bring insurance card and ID  Contact information:   [address] [facility] [address]  [phone]                           Psychiatric follow up will be with  [facility] .  Medical follow up will be with primary care physician.    Time Spent: Less than 30 minutes.  I spent  20  minutes on this discharge activity which included: face-to-face encounter with the patient, reviewing the data in the system, coordination of the care with the nursing staff as well as consultants, documentation, and entering orders.  Time was also spent speaking with family if noted above.      Nilesh Rosa MD  08/08/22  11:22 CDT

## 2022-08-08 NOTE — DISCHARGE INSTRUCTIONS
--Ensure that all mediations (including over the counter meds) are secured in a lock box and that you use a weekly pill planner.    --Ensure all weapons if present (including firearms) are secured (ideally in a gun safe or removed from home) and all ammo is secured and stored separately.  --Continue medications as currently prescribed.  --Continue follow-up with outpatient providers.  --Please contact our Behavioral Health Unit with any questions or concerns at [phone].  --Return to the ER with any suicidal or homicidal ideation, or worsening symptoms.    --The number for the National Suicide & Crisis Hotline is 1-939.849.6830 or 988.  The National Crisis Text number is 807403 or 986.  These may be contacted at any time, if needed.

## 2022-08-08 NOTE — PLAN OF CARE
Treatment team met with patient to discuss and review treatment plan. Pt was encouraged to discuss their reason for admission, as well as their goals for treatment. Team discussed anticipated interventions and treatment modalities that will be used to address goals.Pt given opportunity to discuss any concerns re: treatment plan.  Patient states he came in wiith withdrawal from carfentanyl and states he and his dad have decided to get out of town away from old habits and moving to [place] to work. Patient reports having had suicidal thoughts due to continuing his drug use.  Team Members present during meeting:    MD:Dr. Rosa  APRN: Earnestine Orlando  RN: Delfina Cruz  SW: Arlene Henderson  RT:Val Lozoya  Other: Felix Cavanaugh, Ramu Hernandez, Theresa Mcneill, Nika Smith

## 2022-08-08 NOTE — PROGRESS NOTES
DATA:        MSW met individually with patient this date to introduce role and to discuss hospitalization expectations. Patient agreeable. Reviewed medical record and staffed case with treatment team this date. No major issues identified.      Concern was voiced regarding substance use and educated patient on risks associated with use. Patient was advised to abstain from use and educated on community resources that can help with sobriety and recovery, Patient agreeable to outpatient recovery.      Clinical Maneuvering/Intervention:     MSW assisted patient in processing above session content; acknowledged and normalized patient’s thoughts, feelings, and concerns.  Discussed the therapist/patient relationship and explain the parameters and limitations of relative confidentiality.  Also discussed the importance of active participation, and honesty to the treatment process.  Encouraged the patient to discuss/vent their feelings, frustrations, and fears concerning their ongoing medical issues and validated their feelings.     Allowed patient to freely discuss issues without interruption or judgment. Provided safe, confidential environment to facilitate the development of positive therapeutic relationship and encourage open, honest communication.      MSW addressed discharge safety planning this date. Assisted patient in identifying risk factors which would indicate the need for higher level of care after discharge;  including thoughts to harm self or others and/or self-harming behavior. Encouraged patient to call 911, or present to the nearest emergency room should any of these events occur. Discussed crisis intervention services and means to access.  Encouraged securing any objects of harm.       MSW completed integrated summary, treatment plan, and initiated social history this date.  MSW is strongly encouraging family involvement in treatment.       ASSESSMENT:      The patient is a 26 year old male adult. Pt is  A&Ox4. Pt denies depression and denies anxiety. Pt reports he was going through detox and started having negative thoughts. pt denies any abuse growing up. 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 Pt denies ever being admitted to hospital for mental illness. Pt denies hx of SI and\or attempts. Pt denies any current SI\HI\AVH at this time. Pt has hx of using tobacco, alcohol, meth and THC. Pt admits to using carfentanil prior to coming in. Pt has pending charges and reports hx of violence that includes an assault charge. pt reports having a gun in his home. Pt denies any sleep issues or eating issues. Pt pt has a strong support system with his father. Pt grants consetn for . Pt grants verbal consent to contact father and safety plan on this date.    Called pt's father, with pt's consent. Pt's father, stated they will be going to [place] or [place] for work. Pt's father does not know the date they'd be leaving, possibly around the end of the week. 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 Father brought pt to ED due to depression and pt going through withdrawal. Father states, pt will be picked up by him, and then pt will be going to stay with him. Father reports there is a gun in the home, and father will make sure gun is put up and father will be with pt.      Mental Status Exam:    Hygiene:   good  Cooperation:  Cooperative  Eye Contact:  Good  Psychomotor Behavior:  Hyperactive  Affect:  Appropriate  Speech:  Normal  Goal directed  Thought Content:  Mood congruent  Suicidal:  None  Homicidal:  None  Hallucinations:  None  Delusion:  None  Memory:  Intact  Orientation:  Grossly intact  Reliability:  good  Insight:  Fair  Judgement:  Fair  Impulse Control:  Fair      Goals for treatment: Pt has goal of being d\c and going to work for father.      Prior Hospitalizations / Dates: denies     Childhood History:  denies abuse     Suicide Attempts:  denies     Alcohol:  weekly uses  whiskey or beer    Substances: Pt UDS negative. Reports using Methamphetamines in the past, THC and most recently carfentanil      Sexual:    heterosexual, no issues discussed     Education:   some college     Access to firearms:  has one gun, pt father will remove        PLAN:       Patient to remain hospitalized this date.      Treatment team will focus efforts on stabilizing patient's acute symptoms while providing education on healthy coping and crisis management to reduce hospitalizations.   Patient requires daily psychiatrist evaluation and 24/7 nursing supervision to promote patient  safety.     MSW will offer groups, family education, and appropriate referral.     MSW recommends  Pt followup with outpatient substance use, after care family session, safety planning.

## 2022-08-08 NOTE — PLAN OF CARE
Goal Outcome Evaluation:  Plan of Care Reviewed With: patient  Patient Agreement with Plan of Care: agrees     Progress: no change  Outcome Evaluation: Patient only slept 4.5 hour.  Is supposed toto go home today around noon

## 2022-08-08 NOTE — NURSING NOTE
Behavior   Note any precipitants to event or behavior   Describe level and action of any aggressive behavior or speech and associated interventions.     Anxiety: Patient denies at this time  Depression: fatigue  Pain  0  AVH   no  S/I   no  Plan  no  H/I   no  Plan  no    Affect   euthymic/normal      Note:  Patient resting in room with other patient.  Patient cooperative, calm.  States he did not sleep well last night.  Patient denies SI, AVH, HI.  Patient has no c/o pain.      Intervention    PRN medication utilized:  no    Instructed in medication usage and effects  Medications administered as ordered  Encouraged to verbalize needs      Response    Verbalized understanding   Did patient take medications as ordered yes   Did patient interact with assessment?  yes     Plan    Will monitor for safety  Will monitor every 15 minutes as ordered  Will evaluate and promote the plan of care    Last BM:  unknown date  (Please chart in I/O as well)

## 2022-11-13 LAB
QT INTERVAL: 406 MS
QTC INTERVAL: 415 MS

## 2024-06-29 NOTE — PLAN OF CARE
Goal Outcome Evaluation:  Plan of Care Reviewed With: patient  Patient Agreement with Plan of Care: agrees            Subjective: I am excited about this wound vac  Falls since last visit No(if yes complete the Fall Tracking Form and include bsrifallreport):   Caregiver involvement changes: no changes  Home health supplies by type and quantity ordered/delivered this visit include: supplies ordered yesterday not yet in the home.    Clinician asked if patient has had any physician contact since last home care visit and patient states: NO  Clinician asked if patient has any new or changed medications and patient states:  NO   If Yes, were medications reconciled? N/A   Was the certifying physician notified of changes in medications? N/A     Clinical assessment (what this visit means for the patient overall and need for ongoing skilled care) and progress or lack of progress towards SPECIFIC goals: Patients wound vac arrived after yesterdays visit. Wound vac was placed without issues today. Educated patient and caregiver that is present on wound vac alarms. Educated patient that if wound vac alarms and leak cannot be fixed within 2 hours a wet to dry dressing must be placed and to notify the office. Patient currently does not have a caregiver during most of the day and overnight therefore he will call the office for assistance. Assessment is unchanged and VS WNL. Patient continues to benefit from SN visits for wound care and skilled assessment to prevent infection.     Written Teaching Material Utilized: N/A    Interdisciplinary communication with: N/A    Discharge planning as follows: When wound is 100% healed    Specific plan for next visit: wound care, education, assessment Which Photosensitizer Was Used: Levulan